# Patient Record
Sex: MALE | Race: WHITE
[De-identification: names, ages, dates, MRNs, and addresses within clinical notes are randomized per-mention and may not be internally consistent; named-entity substitution may affect disease eponyms.]

---

## 2017-02-06 ENCOUNTER — HOSPITAL ENCOUNTER (OUTPATIENT)
Dept: HOSPITAL 53 - M LAB REF | Age: 67
End: 2017-02-06
Attending: PODIATRIST
Payer: MEDICARE

## 2017-02-06 DIAGNOSIS — E11.621: Primary | ICD-10-CM

## 2017-02-06 DIAGNOSIS — M79.675: ICD-10-CM

## 2017-02-06 DIAGNOSIS — L97.521: ICD-10-CM

## 2017-02-06 DIAGNOSIS — L97.511: ICD-10-CM

## 2017-08-17 ENCOUNTER — HOSPITAL ENCOUNTER (OUTPATIENT)
Dept: HOSPITAL 53 - M LAB REF | Age: 67
End: 2017-08-17
Attending: PODIATRIST
Payer: MEDICARE

## 2017-08-17 DIAGNOSIS — E11.621: Primary | ICD-10-CM

## 2018-05-30 ENCOUNTER — HOSPITAL ENCOUNTER (OUTPATIENT)
Dept: HOSPITAL 53 - M LAB REF | Age: 68
End: 2018-05-30
Attending: PODIATRIST
Payer: MEDICARE

## 2018-05-30 DIAGNOSIS — L03.116: Primary | ICD-10-CM

## 2018-05-30 PROCEDURE — 87186 SC STD MICRODIL/AGAR DIL: CPT

## 2020-05-17 ENCOUNTER — HOSPITAL ENCOUNTER (EMERGENCY)
Dept: HOSPITAL 53 - M ED | Age: 70
Discharge: ANOTHER HOSPITAL | End: 2020-05-17
Payer: MEDICARE

## 2020-05-17 VITALS — BODY MASS INDEX: 37.3 KG/M2 | WEIGHT: 290.61 LBS | HEIGHT: 74 IN

## 2020-05-17 VITALS — DIASTOLIC BLOOD PRESSURE: 74 MMHG | SYSTOLIC BLOOD PRESSURE: 160 MMHG

## 2020-05-17 VITALS — DIASTOLIC BLOOD PRESSURE: 66 MMHG | SYSTOLIC BLOOD PRESSURE: 134 MMHG

## 2020-05-17 DIAGNOSIS — E11.9: ICD-10-CM

## 2020-05-17 DIAGNOSIS — R20.0: ICD-10-CM

## 2020-05-17 DIAGNOSIS — E66.9: ICD-10-CM

## 2020-05-17 DIAGNOSIS — I48.91: ICD-10-CM

## 2020-05-17 DIAGNOSIS — I45.10: ICD-10-CM

## 2020-05-17 DIAGNOSIS — I10: ICD-10-CM

## 2020-05-17 DIAGNOSIS — R56.9: ICD-10-CM

## 2020-05-17 DIAGNOSIS — E78.9: ICD-10-CM

## 2020-05-17 DIAGNOSIS — I61.1: Primary | ICD-10-CM

## 2020-05-17 DIAGNOSIS — Z88.2: ICD-10-CM

## 2020-05-17 LAB
ALBUMIN SERPL BCG-MCNC: 3.4 GM/DL (ref 3.2–5.2)
ALT SERPL W P-5'-P-CCNC: 11 U/L (ref 12–78)
APTT BLD: 52.3 SECONDS (ref 25–38.4)
BASOPHILS # BLD AUTO: 0.1 10^3/UL (ref 0–0.2)
BASOPHILS NFR BLD AUTO: 1.1 % (ref 0–1)
BILIRUB CONJ SERPL-MCNC: 0.1 MG/DL (ref 0–0.2)
BILIRUB SERPL-MCNC: 0.4 MG/DL (ref 0.2–1)
BUN SERPL-MCNC: 14 MG/DL (ref 7–18)
CALCIUM SERPL-MCNC: 9 MG/DL (ref 8.8–10.2)
CHLORIDE SERPL-SCNC: 108 MEQ/L (ref 98–107)
CK MB CFR.DF SERPL CALC: 1.24
CK MB SERPL-MCNC: 1.1 NG/ML (ref ?–3.6)
CK SERPL-CCNC: 89 U/L (ref 39–308)
CO2 SERPL-SCNC: 23 MEQ/L (ref 21–32)
CREAT SERPL-MCNC: 0.87 MG/DL (ref 0.7–1.3)
EOSINOPHIL # BLD AUTO: 0.2 10^3/UL (ref 0–0.5)
EOSINOPHIL NFR BLD AUTO: 1.8 % (ref 0–3)
GFR SERPL CREATININE-BSD FRML MDRD: > 60 ML/MIN/{1.73_M2} (ref 42–?)
GLUCOSE SERPL-MCNC: 85 MG/DL (ref 70–100)
HCT VFR BLD AUTO: 45.4 % (ref 42–52)
HGB BLD-MCNC: 12.9 G/DL (ref 13.5–17.5)
INR PPP: 2.63
LYMPHOCYTES # BLD AUTO: 1.8 10^3/UL (ref 1.5–5)
LYMPHOCYTES NFR BLD AUTO: 19.8 % (ref 24–44)
MAGNESIUM SERPL-MCNC: 2.2 MG/DL (ref 1.8–2.4)
MCH RBC QN AUTO: 23.9 PG (ref 27–33)
MCHC RBC AUTO-ENTMCNC: 28.4 G/DL (ref 32–36.5)
MCV RBC AUTO: 84.2 FL (ref 80–96)
MONOCYTES # BLD AUTO: 1.1 10^3/UL (ref 0–0.8)
MONOCYTES NFR BLD AUTO: 11.4 % (ref 0–5)
NEUTROPHILS # BLD AUTO: 6.1 10^3/UL (ref 1.5–8.5)
NEUTROPHILS NFR BLD AUTO: 65.6 % (ref 36–66)
PHOSPHATE SERPL-MCNC: 4.1 MG/DL (ref 2.5–4.9)
PLATELET # BLD AUTO: 352 10^3/UL (ref 150–450)
POTASSIUM SERPL-SCNC: 5.2 MEQ/L (ref 3.5–5.1)
PROT SERPL-MCNC: 9.6 GM/DL (ref 6.4–8.2)
PROTHROMBIN TIME: 28 SECONDS (ref 11.8–14)
RBC # BLD AUTO: 5.39 10^6/UL (ref 4.3–6.1)
SODIUM SERPL-SCNC: 139 MEQ/L (ref 136–145)
TROPONIN I SERPL-MCNC: < 0.02 NG/ML (ref ?–0.1)
WBC # BLD AUTO: 9.3 10^3/UL (ref 4–10)

## 2020-05-17 NOTE — REPVR
PROCEDURE INFORMATION: 

Exam: CT Head Without Contrast 

Exam date and time: 5/17/2020 5:13 PM 

Age: 69 years old 

Clinical indication: Follow-up intracranial hemorrhage. Seizure activity. 



TECHNIQUE: 

Imaging protocol: Computed tomography of the head without contrast. 

Radiation optimization: All CT scans at this facility use at least one of these 

dose optimization techniques: automated exposure control; mA and/or kV 

adjustment per patient size (includes targeted exams where dose is matched to 

clinical indication); or iterative reconstruction. 

Other technique: STROKE PROTOCOL was implemented. 



COMPARISON: 

CT Head without contrast 5/17/2020 4:04 PM 



FINDINGS: 



Brain: Unchanged approximately 2.6 x 2.4 x 2.2 cm (7 mL) age indeterminate 

though suspected subacute intraparenchymal hemorrhage within the medial 

superior left frontal lobe with adjacent vasogenic edema. No significant mass 

effect. No midline shift. No new hemorrhages. Nonspecific hypodensities of the 

periventricular and deep subcortical white matter, most likely secondary to 

chronic small vessel ischemic change. No evidence of mass effect or midline 

shift. Gray-white matter differentiation is preserved.

Ventricles: Mild prominence of the ventricles and sulci, most likely attributed 

to parenchymal volume loss. 

Bones/joints: No acute osseus lesion or fracture. 

Sinuses: Unremarkable as visualized. 

Mastoid air cells: Unremarkable. 

Soft tissues: Unremarkable. 





IMPRESSION: 

1. Unchanged approximately 2.6 x 2.4 x 2.2 cm (7 mL) age indeterminate though 

suspected subacute intraparenchymal hemorrhage within the medial superior left 

frontal lobe with adjacent vasogenic edema. Follow-up at the discretion of 

neurology.

2. Other chronic findings, as above. 



Electronically signed by: New Dietz On 05/17/2020  17:31:59 PM

## 2020-05-17 NOTE — ECGEPIP
[facility] - ED

                                       

                                       Test Date:    2020

Pat Name:     DAMIAN STEEL        Department:   

Patient ID:   [id number]                 Room:         -

Gender:       Male                     Technician:   manda

:          1950               Requested By: GORAN YAO

Order Number: [id number]     Reading MD:   Malorie Carrizales

                                 Measurements

Intervals                              Axis          

Rate:         107                      P:            

WV:           0                        QRS:          24

QRSD:         100                      T:            38

QT:           350                                    

QTc:          468                                    

                           Interpretive Statements

ATRIAL FIBRILLATION WITH RAPID VENTRICULAR RESPONSE

INCOMPLETE RIGHT BUNDLE BRANCH BLOCK

MODERATE ST DEPRESSION

NO PRIOR

Electronically Signed on 2020 21:11:53 EDT by Malorie Carrizales

## 2020-05-18 NOTE — REP
REASON FOR EXAM:  Seizure-like activity.

 

There are no prior studies for comparison.

 

Preliminary report was given by Dr. Brandon at the time the examination was

performed.

 

There is marked and severe motion artifact restricting the examination's

diagnostic capability. There is no gross shift in the midline structures. There

are no gross extra-axial fluid collections. There is no gross ventriculomegaly.

There is no gross skull fracture, however, a skull fracture of significance could

still be obscured by the severe artifact.

 

IMPRESSION:

 

Nondiagnostic examination. Repeat examination is required.

 

 

Electronically Signed by

Vladimir Graham DO 05/18/2020 11:53 A

## 2020-05-18 NOTE — REP
PORTABLE CHEST X-RAY:  SINGLE VIEW.

 

HISTORY:  Status epilepticus.

 

FINDINGS:  The patient is rotated somewhat to the right.  Monitoring electrodes

overlie the chest.  Left hemidiaphragm is slightly elevated.  Heart is mildly

enlarged.  There is no evidence of pleural effusion or pulmonary edema.  In

comparison with chest x-ray from April 14, 2015, there is no significant change.

 

IMPRESSION:

 

No acute disease.

 

 

Electronically Signed by

Mak Birmingham MD 05/18/2020 10:25 A

## 2020-05-20 ENCOUNTER — HOSPITAL ENCOUNTER (INPATIENT)
Dept: HOSPITAL 53 - M PM&R | Age: 70
LOS: 9 days | Discharge: HOME HEALTH | DRG: 57 | End: 2020-05-29
Attending: PHYSICAL MEDICINE & REHABILITATION | Admitting: PHYSICAL MEDICINE & REHABILITATION
Payer: MEDICARE

## 2020-05-20 VITALS — SYSTOLIC BLOOD PRESSURE: 138 MMHG | DIASTOLIC BLOOD PRESSURE: 79 MMHG

## 2020-05-20 VITALS — HEIGHT: 74 IN | WEIGHT: 255.74 LBS | BODY MASS INDEX: 32.82 KG/M2

## 2020-05-20 VITALS — DIASTOLIC BLOOD PRESSURE: 62 MMHG | SYSTOLIC BLOOD PRESSURE: 126 MMHG

## 2020-05-20 VITALS — DIASTOLIC BLOOD PRESSURE: 70 MMHG | SYSTOLIC BLOOD PRESSURE: 124 MMHG

## 2020-05-20 DIAGNOSIS — I48.91: ICD-10-CM

## 2020-05-20 DIAGNOSIS — R56.9: ICD-10-CM

## 2020-05-20 DIAGNOSIS — Z88.2: ICD-10-CM

## 2020-05-20 DIAGNOSIS — E78.5: ICD-10-CM

## 2020-05-20 DIAGNOSIS — I10: ICD-10-CM

## 2020-05-20 DIAGNOSIS — Z79.4: ICD-10-CM

## 2020-05-20 DIAGNOSIS — E11.9: ICD-10-CM

## 2020-05-20 DIAGNOSIS — Z74.1: ICD-10-CM

## 2020-05-20 DIAGNOSIS — F32.9: ICD-10-CM

## 2020-05-20 DIAGNOSIS — L30.4: ICD-10-CM

## 2020-05-20 DIAGNOSIS — L85.9: ICD-10-CM

## 2020-05-20 DIAGNOSIS — I82.531: ICD-10-CM

## 2020-05-20 DIAGNOSIS — I87.2: ICD-10-CM

## 2020-05-20 DIAGNOSIS — R26.89: ICD-10-CM

## 2020-05-20 DIAGNOSIS — Z79.82: ICD-10-CM

## 2020-05-20 DIAGNOSIS — Z79.899: ICD-10-CM

## 2020-05-20 DIAGNOSIS — I69.151: Primary | ICD-10-CM

## 2020-05-20 DIAGNOSIS — Z74.09: ICD-10-CM

## 2020-05-20 DIAGNOSIS — F17.200: ICD-10-CM

## 2020-05-20 RX ADMIN — WHITE PETROLATUM SCH DOSE: 57; 17 PASTE TOPICAL at 21:26

## 2020-05-20 RX ADMIN — ACETAMINOPHEN PRN MG: 325 TABLET ORAL at 13:27

## 2020-05-20 RX ADMIN — IPRATROPIUM BROMIDE AND ALBUTEROL SULFATE SCH ML: .5; 3 SOLUTION RESPIRATORY (INHALATION) at 15:33

## 2020-05-20 RX ADMIN — SIMVASTATIN SCH MG: 20 TABLET, FILM COATED ORAL at 21:23

## 2020-05-20 RX ADMIN — NYSTATIN SCH DOSE: 100000 POWDER TOPICAL at 21:25

## 2020-05-20 RX ADMIN — SODIUM CHLORIDE SCH UNITS: 4.5 INJECTION, SOLUTION INTRAVENOUS at 21:25

## 2020-05-20 RX ADMIN — Medication SCH DOSE: at 21:26

## 2020-05-20 RX ADMIN — SENNOSIDES SCH TAB: 8.6 TABLET, FILM COATED ORAL at 21:27

## 2020-05-20 RX ADMIN — WHITE PETROLATUM SCH DOSE: 57; 17 PASTE TOPICAL at 15:37

## 2020-05-20 RX ADMIN — ACETAMINOPHEN PRN MG: 325 TABLET ORAL at 17:36

## 2020-05-20 RX ADMIN — DOCUSATE SODIUM SCH MG: 100 CAPSULE, LIQUID FILLED ORAL at 21:24

## 2020-05-20 RX ADMIN — IPRATROPIUM BROMIDE AND ALBUTEROL SULFATE SCH ML: .5; 3 SOLUTION RESPIRATORY (INHALATION) at 19:55

## 2020-05-20 RX ADMIN — WHITE PETROLATUM SCH DOSE: 57; 17 PASTE TOPICAL at 17:36

## 2020-05-20 NOTE — HPEPDOC
Physiatrist Note


DATE OF ADMISSION:5-20-20





DATE OF SERVICE: 5-20-20





TIME OF ADMISSION: Please refer to physician's admission order.





SOURCE OF ADMISSION INFORMATION: [facility] record and patient





CHIEF COMPLAINT: intracranial hemorrhage/stroke





HISTORY OF PRESENT ILLNESS:


69M pmh Afib on Coumadin, chronic RLE DVT, DM, smoker who presented to [facility] ED 

with right sided weakness and shaking where he was found to have a left 

posterior frontal intra-parenchymal hemorrhage and transferred to [facility] 

on 5-17-20 after receiving vitamin K and FFP. CTA neck 5/17/20 showed  No 

significant interval change in the left posterior frontal intraparenchymal 

hemorrhage and surrounding edema. An MRI of the brain without and with 

intravenous contrast administration is recommended to rule out an underlying 

mass. He was admitted to the Neuro ICU, started on Keppra for seizure 

prophylaxis, and had follow-up EEG which was negative for epileptiform activity.

His Coumadin was held and follow-up MRI with and without contrast did not show 

mass, instead showing Subcortical intraparenchymal hemorrhage in the superior 

left frontoparietal lobes with surrounding edema and mild mass effect. No 

apparent enhancement in the region of the hemorrhage allowing for the limitation

of the inherent T1 hyperintensity. No gross abnormal enhancement elsewhere in 

the brain parenchyma. He was evaluated by wound care services for his chronic 

venous stasis changes on his bilat LE, open ankle ulcers, and what was initially

thought to be a DTI to his sacrum thought to be chronic scarring and remodeling 

from history of incontinence. 


He was evaluated by therapy, found to have significant impairments in mobility 

and ADLs, started on aspirin and SSI for motor recovery and deemed medically 

appropriate for discharge to ARU on 5-20-20.











REVIEW OF SYSTEMS: The following is a completed review of systems and has been 

reviewed. Review of systems otherwise unremarkable.


PAIN: Patient self reports no pain


EYES: No recent vision changes


EARS, NOSE, & THROAT: No throat pain, or dysphagia, or rhinorrhea


CARDIOVASCULAR: Denies chest pain or palpitations


PULMONARY: Denies shortness of breath


GASTROINTESTINAL: Denies constipation/diarrhea


GENITOURINARY: denies dysuria


MUSCULOSKELETAL: generalized weakness


NEUROLOGICAL: mild right sided paresis


HEMATOLOGICAL: denies easy bruising


SKIN: sacral ulcer and bilat LE hyperpigmentation


PSYCHIATRIC: Unremarkable.


All other review of systems found to be negative.





PAST MEDICAL HISTORY:


as per HPI








SHx:


perianal cyst removal 





ALLERGIES: Please see below.





MEDICATIONS: Please see below.





SOCIAL HISTORY: Daily pack a day smoker, no etoh/illicit drugs





DIET: low sodium 





PHYSICAL EXAMINATION:


VITAL SIGNS: Please see below.


GENERAL: Pleasant and cooperative. No acute distress. 


HEENT: PERRL. Extraocular movements intact. Clear conjunctiva, no facial droop


CARDIOVASCULAR: Regular rate and rhythm. No murmurs, rubs, or gallops


LUNGS: +scattered rhonchi


ABDOMEN: Soft, nontender, nondistended. Positive bowel sounds. Normal active 

bowel sounds


NEUROLOGICAL: Alert and oriented times three. Cranial nerves II through XII 

grossly intact. Sensation grossly intact in all 4 limbs


EXTREMITIES: 5\5 strength left UE, 4+ RUE with mild pronator drift, 4+\5 

strength right lower extremity. 5/5 strength in left lower extremity. 





SKIN: large sacral and posterior thigh hyperpigmentation, scar tissue vs large 

DTI?, intertriginous erythema, bilat calves with venous stasis changes and 

hyperkeratotic 





LABORATORY DATA: Please see below.





IMAGING:Imaging documentation personally reviewed by record





FUNCTIONAL STATUS: 


Premorbid: Modified Independent with all activities of daily life as well as 

mobility with RW 


On Admission: Max-total assist for bed mobility, functional transfers, min 

assist for feeding


GOALS: Mod-I household distances with RW, functional transfers, dressing, 

toileting, supervision for bathing 





ASSESSMENT:69-year-old M with past medical history of Afib and chronic RLE DVT 

who presents status post  hemorrhagic stroke





PLAN:


1. Rehab- PT/OT advnace gait and ADLs, strengthen/stretch/maintain ROM all 4 

limbs


SLP- cog eval, patient was tolerating regular diet, however will downgrade to 

level 3 until SLP evaluates him here


2. Neuro: s/p left posterior frontal intraparenchymal hemorrhage with right 

sided paresis, Warfarin on hold to be cleared by neurosurgery


-c/u ASA, statin for secodnay stroke prevention and optimize BP control


-c/u prozac for motor recovery


2. CArdiac: hx of Afib- coumadin on hold, c/u betablocker


-recent ECHO 5-18-20 showing, "left ventricle size is normal. Left ventricular 

wall thickness is 


normal. Overall LV systolic function is in the low normal range. The calculated 

left ventricular ejection fraction is 51.4% . Indeterminate LV diastolic 

function." will monitor for fluid overload, medicine consulted to assist in 

overall management


-HLD c/u Zocor


-HTn c/u lisinoprol


3. Endo: A1c 5.4% on 5-17-20 will hold off on continuing metformin and monitor 

FS


4. Resp: current smoker, c/u NicoDerm patch, Duonebs, guaifenesin, and incentive

spirometry, monitor for infection


6. Skin: patient with large sacral/thigh ecchymosis and scar tissue, unclear if 

deep tissue injury as per [facility]XX notes appears chronic- will c/u barrier cream, 

turn q2 in bed, and avoid elevation of head of bed


-heel floats/optifoam to bilat heels


-venous stasis changes to bilat LE- c/u vanicream


-intertriginous rash c/u nystatin powder 


7. Pain: tylenol and oxycodone prn


8. DVT ppx: patient with known chronic right sided DVT, recent Doppler 5-19-20, 

"Chronic non occlusive deep venous thrombosis of proximal


to distal right popliteal vein." , c/u heparin 


9. GI ppx; protonix


10. : monitor PVRs


11. Dispo: tbd








POST ADMISSION PHYSICIAN EVALUATION: 


Medical and functional status: Description of medical status, medical 

assessment: As above. Rehabilitation diagnosis and current and prior cold morbid

medical conditions as above. Risk of complications and plans to mitigate them as

above. Description of functional status current status is as above. Prior status

as above.


Status compared to preadmission: There are no clinically significant differences

between the patient's current status and the information described on the 

preadmission screening document.


Treatment plan anticipated: Treatment plan is as described above. Required 

disciplines including physical therapy, occupational therapy, others as noted 

above.


Intensity of services: 3 hours a day, 6 days a week. 


Special considerations: There are no specific special or safety considerations 

that would likely preclude immediate implementation of an intensive 

rehabilitation program or subsequently influence the plan of care.





ATTESTATION: Considering all the information above, it is my best judgment that 

this patient requires intensive rehabilitation therapy as described above and an

inpatient hospital environment due to the complexity of nursing, medical, and 

rehabilitation needs required by the patient. Furthermore, this patient can 

reasonably be expected to participate in an benefit from an inpatient 

rehabilitation stay with an interdisciplinary team approach to the delivery of 

rehabilitation care under the direction and supervision of rehabilitation 

physician.





PROGNOSIS: good





ESTIMATED LENGTH OF STAY:18-21 days.





PROJECTED DISCHARGE DESTINATION: Home with family support and any durable 

medical equipment required to increase functional safety and mobility.





TIME SPENT COUNSELING AND COORDINATING INITIAL CARE: Greater than 70 minutes.


Vital Signs





Vital Sign - Last 24 Hours








 5/20/20 5/20/20 5/20/20 5/20/20





 12:00 13:25 14:00 14:00


 


Temp 97.2  98.5 98.5


 


Pulse 68  72 72


 


Resp 18 18 18 18


 


B/P (MAP) 124/70 (88)  126/62 126/62 (83)


 


Pulse Ox 93  96 96


 


O2 Delivery Room Air  Room Air Room Air











Laboratory Data


Labs 24H


Laboratory Tests 2


5/20/20 12:25: Coronavirus (COVID-19)(PCR) NEGATIVE





Home Medications


Scheduled


Aspirin (Aspirin EC) 81 Mg Tablet.dr, 81 MG PO DAILY, (Reported)


   STARTED AT [facility] 


Carvedilol (Carvedilol) 12.5 Mg Tablet, 12.5 MG PO BID, (Reported)


   STARTED AT [facility] 


Docusate Sodium (Colace) 100 Mg Capsule, 100 MG PO DAILY, (Reported)


Fluoxetine Hcl (Fluoxetine HCl) 40 Mg Capsule, 40 MG PO DAILY, (Reported)


Heparin Sodium,Porcine (Heparin Sodium) 5,000 Unit/1 Ml Vial, 5,000 UNIT SC BID,

(Reported)


   STARTED AT [facility] 


Insulin Human Lispro (Humalog) 100 Unit/1 Ml Vial, 1 DOSE SC AC, (Reported)


   PER SLIDING SCALE - STARTED AT [facility] 


Lidocaine (Lidocaine) 5% Adh..patch, 2 PATCH TOP DAILY, (Reported)


   APPLY TO BOTH KNEES, STARTED AT [facility] 


Lisinopril (Lisinopril) 2.5 Mg Tablet, 2.5 MG PO DAILY, (Reported)


Metformin HCl (Metformin HCl) 500 Mg Tablet, 500 MG PO DAILY, (Reported)


Nicotine (Nicotine Patch) 14 Mg/24 Hr Patch.td24, 14 MG TOP DAILY, (Reported)


   STARTED AT [facility] 


Simvastatin (Simvastatin) 20 Mg Tablet, 20 MG PO QHS, (Reported)


Valproic Acid (Valproic Acid) 250 Mg Capsule, 750 MG PO BID, (Reported)


   STARTED AT [facility] 





Scheduled PRN


Acetaminophen (Tylenol) 325 Mg Tablet, 650 MG PO Q6H PRN for PAIN, (Reported)


Hydrocodone/Acetaminophen (Hydrocodone-Acetamin  mg) 1 Each Tablet, 1 TAB 

PO Q6H PRN for PAIN, (Reported)





Allergies


Coded Allergies:  


     Sulfa (Sulfonamide Antibiotics) (Verified  Allergy, Intermediate, rash, 

5/17/20)





A-FIB/CHADSVASC


A-FIB History


Current/History of A-Fib/PAF?:  Yes


Current PO Anticoag Therapy:  No











STEPHANE FERNANDO MD         May 20, 2020 14:43

## 2020-05-21 VITALS — DIASTOLIC BLOOD PRESSURE: 76 MMHG | SYSTOLIC BLOOD PRESSURE: 136 MMHG

## 2020-05-21 VITALS — DIASTOLIC BLOOD PRESSURE: 77 MMHG | SYSTOLIC BLOOD PRESSURE: 134 MMHG

## 2020-05-21 VITALS — DIASTOLIC BLOOD PRESSURE: 58 MMHG | SYSTOLIC BLOOD PRESSURE: 104 MMHG

## 2020-05-21 LAB
ALBUMIN SERPL BCG-MCNC: 2.7 GM/DL (ref 3.2–5.2)
ALT SERPL W P-5'-P-CCNC: 11 U/L (ref 12–78)
BASOPHILS # BLD AUTO: 0.1 10^3/UL (ref 0–0.2)
BASOPHILS NFR BLD AUTO: 1 % (ref 0–1)
BILIRUB SERPL-MCNC: 0.5 MG/DL (ref 0.2–1)
BUN SERPL-MCNC: 17 MG/DL (ref 7–18)
CALCIUM SERPL-MCNC: 9 MG/DL (ref 8.8–10.2)
CHLORIDE SERPL-SCNC: 110 MEQ/L (ref 98–107)
CO2 SERPL-SCNC: 28 MEQ/L (ref 21–32)
CREAT SERPL-MCNC: 0.86 MG/DL (ref 0.7–1.3)
EOSINOPHIL # BLD AUTO: 0.2 10^3/UL (ref 0–0.5)
EOSINOPHIL NFR BLD AUTO: 2.6 % (ref 0–3)
EST. AVERAGE GLUCOSE BLD GHB EST-MCNC: 120 MG/DL (ref 60–110)
GFR SERPL CREATININE-BSD FRML MDRD: > 60 ML/MIN/{1.73_M2} (ref 42–?)
GLUCOSE SERPL-MCNC: 100 MG/DL (ref 70–100)
HCT VFR BLD AUTO: 42.4 % (ref 42–52)
HGB BLD-MCNC: 12.4 G/DL (ref 13.5–17.5)
LYMPHOCYTES # BLD AUTO: 1 10^3/UL (ref 1.5–5)
LYMPHOCYTES NFR BLD AUTO: 14.4 % (ref 24–44)
MCH RBC QN AUTO: 24.6 PG (ref 27–33)
MCHC RBC AUTO-ENTMCNC: 29.2 G/DL (ref 32–36.5)
MCV RBC AUTO: 84.1 FL (ref 80–96)
MONOCYTES # BLD AUTO: 0.7 10^3/UL (ref 0–0.8)
MONOCYTES NFR BLD AUTO: 10.6 % (ref 0–5)
NEUTROPHILS # BLD AUTO: 4.9 10^3/UL (ref 1.5–8.5)
NEUTROPHILS NFR BLD AUTO: 71.1 % (ref 36–66)
PLATELET # BLD AUTO: 274 10^3/UL (ref 150–450)
POTASSIUM SERPL-SCNC: 4.7 MEQ/L (ref 3.5–5.1)
PROT SERPL-MCNC: 7.8 GM/DL (ref 6.4–8.2)
RBC # BLD AUTO: 5.04 10^6/UL (ref 4.3–6.1)
SODIUM SERPL-SCNC: 144 MEQ/L (ref 136–145)
WBC # BLD AUTO: 7 10^3/UL (ref 4–10)

## 2020-05-21 RX ADMIN — Medication SCH DOSE: at 08:57

## 2020-05-21 RX ADMIN — WHITE PETROLATUM SCH DOSE: 57; 17 PASTE TOPICAL at 12:00

## 2020-05-21 RX ADMIN — NYSTATIN SCH DOSE: 100000 POWDER TOPICAL at 08:57

## 2020-05-21 RX ADMIN — SODIUM CHLORIDE SCH UNITS: 4.5 INJECTION, SOLUTION INTRAVENOUS at 08:56

## 2020-05-21 RX ADMIN — ASPIRIN SCH MG: 81 TABLET ORAL at 08:53

## 2020-05-21 RX ADMIN — IPRATROPIUM BROMIDE AND ALBUTEROL SULFATE SCH ML: .5; 3 SOLUTION RESPIRATORY (INHALATION) at 13:05

## 2020-05-21 RX ADMIN — NYSTATIN SCH DOSE: 100000 POWDER TOPICAL at 20:36

## 2020-05-21 RX ADMIN — SENNOSIDES SCH TAB: 8.6 TABLET, FILM COATED ORAL at 20:34

## 2020-05-21 RX ADMIN — DOCUSATE SODIUM SCH MG: 100 CAPSULE, LIQUID FILLED ORAL at 08:55

## 2020-05-21 RX ADMIN — IPRATROPIUM BROMIDE AND ALBUTEROL SULFATE SCH ML: .5; 3 SOLUTION RESPIRATORY (INHALATION) at 07:31

## 2020-05-21 RX ADMIN — DOCUSATE SODIUM SCH MG: 100 CAPSULE, LIQUID FILLED ORAL at 20:34

## 2020-05-21 RX ADMIN — PANTOPRAZOLE SODIUM SCH MG: 40 TABLET, DELAYED RELEASE ORAL at 08:53

## 2020-05-21 RX ADMIN — WHITE PETROLATUM SCH DOSE: 57; 17 PASTE TOPICAL at 18:49

## 2020-05-21 RX ADMIN — ACETAMINOPHEN PRN MG: 325 TABLET ORAL at 08:55

## 2020-05-21 RX ADMIN — Medication SCH DOSE: at 20:36

## 2020-05-21 RX ADMIN — WHITE PETROLATUM SCH DOSE: 57; 17 PASTE TOPICAL at 05:17

## 2020-05-21 RX ADMIN — SIMVASTATIN SCH MG: 20 TABLET, FILM COATED ORAL at 20:34

## 2020-05-21 RX ADMIN — IPRATROPIUM BROMIDE AND ALBUTEROL SULFATE SCH ML: .5; 3 SOLUTION RESPIRATORY (INHALATION) at 19:31

## 2020-05-21 RX ADMIN — NICOTINE SCH PATCH: 14 PATCH, EXTENDED RELEASE TRANSDERMAL at 08:55

## 2020-05-21 RX ADMIN — SODIUM CHLORIDE SCH UNITS: 4.5 INJECTION, SOLUTION INTRAVENOUS at 20:36

## 2020-05-21 RX ADMIN — LISINOPRIL SCH MG: 2.5 TABLET ORAL at 08:54

## 2020-05-21 NOTE — CR
DATE OF CONSULTATION:  05/21/2020

 

Requested by Dr. Zamora.

 

This is a medical evaluation of Chadwick Beach, who was transferred from

[facility] to our rehabilitation center.  He had an intracerebral

hemorrhage on 05/17/2020.  He was transferred to [facility] Stroke Unit.  No

neurosurgical intervention was required.  He was transferred back for

rehabilitation.

 

PAST MEDICAL HISTORY:  Shows the left super frontal intraparenchymal hemorrhage

treated medically with a reversal of his anticoagulation.  He has a history of

atrial fibrillation for which he was previously anticoagulated, hyperlipidemia,

type 2 diabetes, and a history of depression.  He was started on aspirin on

05/19/2020.  The plan is for followup in the stroke clinic in a month, and they

plan to switch him to Eliquis 5 mg twice a day.

 

TRANSFER MEDICATIONS:

-  aspirin 81 mg daily

-  carvedilol 12.5 mg twice a day

-  Colace as needed

-  Prozac 40 mg daily

-  Vicodin 10/325 as needed

-  lisinopril 2.5 mg daily

-  metformin 500 mg twice a day

-  simvastatin 20 mg daily

 

ALLERGIES:  Are to SULFA.

 

SOCIAL HISTORY:  A pack-per-day smoker.  No alcohol.  He is .

 

FAMILY HISTORY:  Noncontributory.

 

PHYSICAL EXAMINATION:

136/76.  Pulse 80.  Respiratory rate 17.  94% oxygen (O2) saturation.

General appearance:  Alert, conversant, no distress.

HEENT:  Unremarkable.

Lungs clear.

Heart:  Regular rate and rhythm.  1/6 systolic ejection murmur.

Abdomen:  Soft, nontender, no masses.

No peripheral edema.  Venous stasis dermatitis lower extremities.  Pulses

decreased, palpable on both feet.  Normal strength in the arms and legs to gross

testing.

 

IMPRESSION:

 

1.  Status post intraparenchymal hemorrhage.  He is on aspirin 81 mg daily, which

will be continued.  The plan is for him to start Eliquis 5 mg twice a day in a

month after he has seen the stroke unit at [facility].  They plan another repeat CT

scan of his brain before then.

 

2.  Hypertension, well controlled on current regimen.  Continue his current dose

of lisinopril 2.5 mg daily.

 

3.  Type 2 diabetes.  Currently on a restricted-sweets diet.  Not on his

metformin, which he was taking 500 mg daily.  Will check a hemoglobin A1c.  His

blood sugars so far have been normal.  He might not need to restart this.  His

metformin is on hold.  He is currently on sliding scale insulin with coverage.

His blood sugars have been normal to date.  If he requires scant coverage

(typically less than 10 units per day), then the sliding scale coverage could be

discontinued.  I have ordered a hemoglobin A1c.

 

4.  Hyperlipidemia.  Continue his simvastatin 20 mg daily.

 

5.  History of depression.  Continue his fluoxetine 40 mg daily.

 

6.  Recent intraparenchymal hemorrhage.  He is on valproic acid 750 mg twice a

day for seizure prophylaxis.

 

7.  Atrial fibrillation.  His rate is controlled with carvedilol 12.5 mg twice a

day.  His anticoagulant was discontinued after the recent stroke.

## 2020-05-22 VITALS — SYSTOLIC BLOOD PRESSURE: 133 MMHG | DIASTOLIC BLOOD PRESSURE: 68 MMHG

## 2020-05-22 VITALS — SYSTOLIC BLOOD PRESSURE: 130 MMHG | DIASTOLIC BLOOD PRESSURE: 78 MMHG

## 2020-05-22 VITALS — DIASTOLIC BLOOD PRESSURE: 68 MMHG | SYSTOLIC BLOOD PRESSURE: 138 MMHG

## 2020-05-22 RX ADMIN — DOCUSATE SODIUM SCH MG: 100 CAPSULE, LIQUID FILLED ORAL at 09:31

## 2020-05-22 RX ADMIN — NICOTINE SCH PATCH: 14 PATCH, EXTENDED RELEASE TRANSDERMAL at 09:00

## 2020-05-22 RX ADMIN — SENNOSIDES SCH TAB: 8.6 TABLET, FILM COATED ORAL at 20:39

## 2020-05-22 RX ADMIN — SIMVASTATIN SCH MG: 20 TABLET, FILM COATED ORAL at 20:38

## 2020-05-22 RX ADMIN — ACETAMINOPHEN PRN MG: 325 TABLET ORAL at 13:08

## 2020-05-22 RX ADMIN — NYSTATIN SCH DOSE: 100000 POWDER TOPICAL at 20:39

## 2020-05-22 RX ADMIN — WHITE PETROLATUM SCH DOSE: 57; 17 PASTE TOPICAL at 13:08

## 2020-05-22 RX ADMIN — Medication SCH DOSE: at 09:33

## 2020-05-22 RX ADMIN — DOCUSATE SODIUM SCH MG: 100 CAPSULE, LIQUID FILLED ORAL at 20:40

## 2020-05-22 RX ADMIN — SODIUM CHLORIDE SCH UNITS: 4.5 INJECTION, SOLUTION INTRAVENOUS at 20:39

## 2020-05-22 RX ADMIN — IPRATROPIUM BROMIDE AND ALBUTEROL SULFATE SCH ML: .5; 3 SOLUTION RESPIRATORY (INHALATION) at 13:46

## 2020-05-22 RX ADMIN — SODIUM CHLORIDE SCH UNITS: 4.5 INJECTION, SOLUTION INTRAVENOUS at 09:31

## 2020-05-22 RX ADMIN — Medication SCH DOSE: at 20:39

## 2020-05-22 RX ADMIN — WHITE PETROLATUM SCH DOSE: 57; 17 PASTE TOPICAL at 05:13

## 2020-05-22 RX ADMIN — ASPIRIN SCH MG: 81 TABLET ORAL at 09:31

## 2020-05-22 RX ADMIN — NYSTATIN SCH DOSE: 100000 POWDER TOPICAL at 09:32

## 2020-05-22 RX ADMIN — WHITE PETROLATUM SCH DOSE: 57; 17 PASTE TOPICAL at 00:18

## 2020-05-22 RX ADMIN — LISINOPRIL SCH MG: 2.5 TABLET ORAL at 09:32

## 2020-05-22 RX ADMIN — PANTOPRAZOLE SODIUM SCH MG: 40 TABLET, DELAYED RELEASE ORAL at 09:31

## 2020-05-22 RX ADMIN — IPRATROPIUM BROMIDE AND ALBUTEROL SULFATE SCH ML: .5; 3 SOLUTION RESPIRATORY (INHALATION) at 20:44

## 2020-05-22 RX ADMIN — IPRATROPIUM BROMIDE AND ALBUTEROL SULFATE SCH ML: .5; 3 SOLUTION RESPIRATORY (INHALATION) at 07:12

## 2020-05-22 RX ADMIN — WHITE PETROLATUM SCH DOSE: 57; 17 PASTE TOPICAL at 17:31

## 2020-05-22 NOTE — IPNPDOC
PM&R Progress Note


DATE OF SERVICE:  May 21, 2020


Physiatrist Progress Note


Subjective:


Patient wondering if can have oxycodone more frequently. He is agreeable to 

having barrier cream applied to his sacrum during the night.





REVIEW OF SYSTEMS: The following is a completed review of systems and has been 

reviewed. Review of systems otherwise unremarkable.


PAIN: Patient self reports no pain


EYES: No recent vision changes


EARS, NOSE, & THROAT: No throat pain, or dysphagia, or rhinorrhea


CARDIOVASCULAR: Denies chest pain or palpitations


PULMONARY: Denies shortness of breath


GASTROINTESTINAL: Denies constipation/diarrhea


GENITOURINARY: denies dysuria


MUSCULOSKELETAL: generalized weakness


NEUROLOGICAL: mild right sided paresis


HEMATOLOGICAL: denies easy bruising


SKIN: sacral ulcer and bilat LE hyperpigmentation


PSYCHIATRIC: Unremarkable.


All other review of systems found to be negative.

















PHYSICAL EXAMINATION:


VITAL SIGNS: Please see below.


GENERAL: Pleasant and cooperative. No acute distress. 


HEENT: PERRL. Extraocular movements intact. Clear conjunctiva, no facial droop


CARDIOVASCULAR: Regular rate and rhythm. No murmurs, rubs, or gallops


LUNGS: +scattered rhonchi


ABDOMEN: Soft, nontender, nondistended. Positive bowel sounds. Normal active 

bowel sounds


NEUROLOGICAL: Alert and oriented times three. Cranial nerves II through XII aj

sly intact. Sensation grossly intact in all 4 limbs


EXTREMITIES: 5\5 strength left UE, 4+ RUE with mild pronator drift, 4+\5 str

ength right lower extremity. 5/5 strength in left lower extremity. 





SKIN: large sacral and posterior thigh hyperpigmentation, scar tissue vs large 

DTI?, intertriginous erythema, bilat calves with venous stasis changes and 

hyperkeratotic 

















ASSESSMENT:69-year-old M with past medical history of Afib and chronic RLE DVT 

who presents status post  hemorrhagic stroke





PLAN:


1. Rehab- PT/OT advnace gait and ADLs, strengthen/stretch/maintain ROM all 4 

limbs


SLP- cog eval, patient was tolerating regular diet, however will downgrade to 

level 3 until SLP evaluates him here


2. Neuro: s/p left posterior frontal intraparenchymal hemorrhage with right 

sided paresis, Warfarin on hold to be cleared by neurosurgery


-c/u ASA, statin for secondary stroke prevention and optimize BP control


-c/u prozac for motor recovery


2. CArdiac: hx of Afib- coumadin on hold, c/u betablocker


-recent ECHO 5-18-20 showing, "left ventricle size is normal. Left ventricular 

wall thickness is 


normal. Overall LV systolic function is in the low normal range. The calculated 

left ventricular ejection fraction is 51.4% . Indeterminate LV diastolic 

function." will monitor for fluid overload, medicine consulted to assist in 

overall management


-HLD c/u Zocor


-HTn c/u lisinoprol


3. Endo: A1c 5.4% on 5-17-20 will hold off on continuing metformin and monitor 

FS


4. Resp: current smoker, c/u NicoDerm patch, Duonebs, guaifenesin, and incentive

spirometry, monitor for infection


6. Skin: patient with large sacral/thigh ecchymosis and scar tissue, unclear if 

deep tissue injury as per [facility] notes appears chronic- will c/u barrier cream, 

turn q2 in bed, and avoid elevation of head of bed


-heel floats/optifoam to bilat heels


-venous stasis changes to bilat LE- c/u vanicream


-intertriginous rash c/u nystatin powder 


7. Pain: tylenol and oxycodone (will increase to q4h prn)


8. DVT ppx: patient with known chronic right sided DVT, recent Doppler 5-19-20, 

"Chronic non occlusive deep venous thrombosis of proximal


to distal right popliteal vein." c/u heparin 


9. GI ppx; protonix


10. : monitor PVRs


11. Dispo: tbd


Allergies


Coded Allergies:  


     Sulfa (Sulfonamide Antibiotics) (Verified  Allergy, Intermediate, rash, 

5/17/20)





Vital Signs





Vital Signs








  Date Time  Temp Pulse Resp B/P (MAP) Pulse Ox O2 Delivery O2 Flow Rate FiO2


 


5/22/20 06:22   18     


 


5/22/20 06:00 97.8 80  133/68 (89) 93 Room Air  











Laboratory Data


Labs 24H


Laboratory Tests 2


5/21/20 11:35: Bedside Glucose (Misc Panel) 123H


5/21/20 16:31: Bedside Glucose (Misc Panel) 85


5/21/20 19:40: Bedside Glucose (Misc Panel) 115H


5/22/20 05:11: Bedside Glucose (Misc Panel) 101





Current Medications


Current Medications





Current Medications








 Medications


  (Trade)  Dose


 Ordered  Sig/Ministerio


 Route


 PRN Reason  Start Time


 Stop Time Status Last Admin


Dose Admin


 


 Acetaminophen


  (Tylenol Tab)  650 mg  Q4HP  PRN


 PO


 fever/MILD PAIN (PS 1-4)  5/20/20 12:30


    5/21/20 08:55





 


 Albuterol/


 Ipratropium


  (Duoneb (Ipr


 0.5mg/Alb 2.5mg))  3 ml  RTID


 NEB


   5/20/20 14:00


    5/22/20 07:12





 


 Aspirin


  (Ecotrin)  81 mg  DAILY


 PO


   5/21/20 09:00


    5/21/20 08:53





 


 Carvedilol


  (COReg)  12.5 mg  BID


 PO


   5/20/20 21:00


    5/21/20 20:35





 


 Dextrose


  (Dextrose 50%)  25 ml  ASDIRECTED  PRN


 IV


 SEE LABEL COMMENTS  5/20/20 12:30


     





 


 Docusate Sodium


  (Colace)  100 mg  BID


 PO


   5/20/20 21:00


    5/21/20 20:34





 


 Emollient Cream


  (Vanicream)  1 dose  BID


 TOP


   5/20/20 21:00


    5/21/20 20:36





 


 Fluoxetine HCl


  (PROzac)  40 mg  DAILY


 PO


   5/21/20 09:00


    5/21/20 08:56





 


 Glucagon


  (Glucagon)  1 mg  ASDIRECTED  PRN


 SC


 SEE LABEL COMMENTS  5/20/20 12:30


     





 


 Glucose


  (Glucose)  16 GM  ASDIRECTED  PRN


 PO


 SEE LABEL COMMENTS  5/20/20 12:30


     





 


 Guaifenesin


  (Robitussin Tab)  400 mg  TID


 PO


   5/20/20 16:00


    5/21/20 20:35





 


 Heparin Sodium


  (Porcine)


  (Heparin)  5,000 units  Q12H


 SC


   5/20/20 21:00


    5/21/20 20:36





 


 Home Med


  (Med Rec


 Complete!)    ASDIRECTED


 XX


   5/20/20 13:30


 5/20/20 13:31 DC  





 


 Insulin Human


 Lispro


  (HumaLOG INSULIN)  SEE


 PROTOCOL


 TABLE  AC


 SC


   5/20/20 12:00


 5/20/20 13:22 DC  





 


 Insulin Human


 Lispro


  (HumaLOG INSULIN)  SEE


 PROTOCOL


 TABLE  QHS


 SC


   5/20/20 21:00


 5/20/20 13:22 DC  





 


 Lisinopril


  (Prinivil)  2.5 mg  DAILY


 PO


   5/21/20 09:00


    5/21/20 08:54





 


 Magnesium


 Hydroxide


  (Milk Of


 Magnesia)  30 ml  DAILYPRN  PRN


 PO


 CONSTIPATION  5/20/20 12:30


     





 


 Nicotine


  (Nicoderm Cq


 14mg)  1 patch  DAILY


 TD


   5/21/20 09:00


    5/21/20 08:55





 


 Nystatin


  (Mycostatin


 Powder, Nystop)  apply to


 abdominal


 fo...  BID


 TOP


   5/20/20 21:00


    5/21/20 20:36





 


 Oxycodone HCl


  (Roxicodone,


 Oxyir)  5 mg  Q4H  PRN


 PO


 PAIN  5/21/20 12:45


    5/22/20 05:37





 


 Oxycodone HCl


  (Roxicodone,


 Oxyir)  5 mg  Q6HP  PRN


 PO


 PAIN  5/20/20 12:30


 5/21/20 12:31 DC 5/21/20 11:21





 


 Pantoprazole


 Sodium


  (Protonix)  40 mg  DAILY


 PO


   5/21/20 09:00


    5/21/20 08:53





 


 Senna


  (Senokot)  1 tab  QHS


 PO


   5/20/20 21:00


    5/21/20 20:34





 


 Simvastatin


  (Zocor)  20 mg  QHS


 PO


   5/20/20 21:00


    5/21/20 20:34




















STEPHANE FERNANDO MD         May 22, 2020 09:32

## 2020-05-23 VITALS — DIASTOLIC BLOOD PRESSURE: 62 MMHG | SYSTOLIC BLOOD PRESSURE: 140 MMHG

## 2020-05-23 VITALS — DIASTOLIC BLOOD PRESSURE: 59 MMHG | SYSTOLIC BLOOD PRESSURE: 111 MMHG

## 2020-05-23 VITALS — SYSTOLIC BLOOD PRESSURE: 129 MMHG | DIASTOLIC BLOOD PRESSURE: 77 MMHG

## 2020-05-23 RX ADMIN — ASPIRIN SCH MG: 81 TABLET ORAL at 10:08

## 2020-05-23 RX ADMIN — IPRATROPIUM BROMIDE AND ALBUTEROL SULFATE SCH ML: .5; 3 SOLUTION RESPIRATORY (INHALATION) at 07:49

## 2020-05-23 RX ADMIN — DOCUSATE SODIUM SCH MG: 100 CAPSULE, LIQUID FILLED ORAL at 09:00

## 2020-05-23 RX ADMIN — Medication SCH DOSE: at 10:09

## 2020-05-23 RX ADMIN — IPRATROPIUM BROMIDE AND ALBUTEROL SULFATE SCH ML: .5; 3 SOLUTION RESPIRATORY (INHALATION) at 13:11

## 2020-05-23 RX ADMIN — LISINOPRIL SCH MG: 2.5 TABLET ORAL at 10:07

## 2020-05-23 RX ADMIN — NYSTATIN SCH DOSE: 100000 POWDER TOPICAL at 20:59

## 2020-05-23 RX ADMIN — WHITE PETROLATUM SCH DOSE: 57; 17 PASTE TOPICAL at 17:20

## 2020-05-23 RX ADMIN — Medication SCH DOSE: at 20:59

## 2020-05-23 RX ADMIN — SIMVASTATIN SCH MG: 20 TABLET, FILM COATED ORAL at 20:59

## 2020-05-23 RX ADMIN — DOCUSATE SODIUM SCH MG: 100 CAPSULE, LIQUID FILLED ORAL at 20:59

## 2020-05-23 RX ADMIN — SODIUM CHLORIDE SCH UNITS: 4.5 INJECTION, SOLUTION INTRAVENOUS at 20:58

## 2020-05-23 RX ADMIN — NYSTATIN SCH DOSE: 100000 POWDER TOPICAL at 10:08

## 2020-05-23 RX ADMIN — SODIUM CHLORIDE SCH UNITS: 4.5 INJECTION, SOLUTION INTRAVENOUS at 10:10

## 2020-05-23 RX ADMIN — NICOTINE SCH PATCH: 14 PATCH, EXTENDED RELEASE TRANSDERMAL at 09:00

## 2020-05-23 RX ADMIN — IPRATROPIUM BROMIDE AND ALBUTEROL SULFATE SCH ML: .5; 3 SOLUTION RESPIRATORY (INHALATION) at 21:10

## 2020-05-23 RX ADMIN — WHITE PETROLATUM SCH DOSE: 57; 17 PASTE TOPICAL at 00:00

## 2020-05-23 RX ADMIN — WHITE PETROLATUM SCH DOSE: 57; 17 PASTE TOPICAL at 12:00

## 2020-05-23 RX ADMIN — PANTOPRAZOLE SODIUM SCH MG: 40 TABLET, DELAYED RELEASE ORAL at 10:07

## 2020-05-23 RX ADMIN — SENNOSIDES SCH TAB: 8.6 TABLET, FILM COATED ORAL at 20:58

## 2020-05-23 RX ADMIN — WHITE PETROLATUM SCH DOSE: 57; 17 PASTE TOPICAL at 05:50

## 2020-05-24 VITALS — SYSTOLIC BLOOD PRESSURE: 145 MMHG | DIASTOLIC BLOOD PRESSURE: 80 MMHG

## 2020-05-24 VITALS — SYSTOLIC BLOOD PRESSURE: 132 MMHG | DIASTOLIC BLOOD PRESSURE: 75 MMHG

## 2020-05-24 VITALS — SYSTOLIC BLOOD PRESSURE: 142 MMHG | DIASTOLIC BLOOD PRESSURE: 84 MMHG

## 2020-05-24 RX ADMIN — SENNOSIDES SCH TAB: 8.6 TABLET, FILM COATED ORAL at 19:34

## 2020-05-24 RX ADMIN — IPRATROPIUM BROMIDE AND ALBUTEROL SULFATE SCH ML: .5; 3 SOLUTION RESPIRATORY (INHALATION) at 07:27

## 2020-05-24 RX ADMIN — NYSTATIN SCH DOSE: 100000 POWDER TOPICAL at 19:34

## 2020-05-24 RX ADMIN — DOCUSATE SODIUM SCH MG: 100 CAPSULE, LIQUID FILLED ORAL at 08:54

## 2020-05-24 RX ADMIN — IPRATROPIUM BROMIDE AND ALBUTEROL SULFATE SCH ML: .5; 3 SOLUTION RESPIRATORY (INHALATION) at 13:13

## 2020-05-24 RX ADMIN — SODIUM CHLORIDE SCH UNITS: 4.5 INJECTION, SOLUTION INTRAVENOUS at 08:52

## 2020-05-24 RX ADMIN — SIMVASTATIN SCH MG: 20 TABLET, FILM COATED ORAL at 19:34

## 2020-05-24 RX ADMIN — Medication SCH DOSE: at 19:35

## 2020-05-24 RX ADMIN — ASPIRIN SCH MG: 81 TABLET ORAL at 08:53

## 2020-05-24 RX ADMIN — NICOTINE SCH PATCH: 14 PATCH, EXTENDED RELEASE TRANSDERMAL at 08:53

## 2020-05-24 RX ADMIN — WHITE PETROLATUM SCH DOSE: 57; 17 PASTE TOPICAL at 23:56

## 2020-05-24 RX ADMIN — WHITE PETROLATUM SCH DOSE: 57; 17 PASTE TOPICAL at 00:00

## 2020-05-24 RX ADMIN — WHITE PETROLATUM SCH DOSE: 57; 17 PASTE TOPICAL at 15:27

## 2020-05-24 RX ADMIN — NYSTATIN SCH DOSE: 100000 POWDER TOPICAL at 08:55

## 2020-05-24 RX ADMIN — DOCUSATE SODIUM SCH MG: 100 CAPSULE, LIQUID FILLED ORAL at 19:34

## 2020-05-24 RX ADMIN — WHITE PETROLATUM SCH DOSE: 57; 17 PASTE TOPICAL at 06:06

## 2020-05-24 RX ADMIN — SODIUM CHLORIDE SCH UNITS: 4.5 INJECTION, SOLUTION INTRAVENOUS at 19:34

## 2020-05-24 RX ADMIN — PANTOPRAZOLE SODIUM SCH MG: 40 TABLET, DELAYED RELEASE ORAL at 08:54

## 2020-05-24 RX ADMIN — WHITE PETROLATUM SCH DOSE: 57; 17 PASTE TOPICAL at 12:00

## 2020-05-24 RX ADMIN — Medication SCH DOSE: at 08:55

## 2020-05-24 RX ADMIN — IPRATROPIUM BROMIDE AND ALBUTEROL SULFATE SCH ML: .5; 3 SOLUTION RESPIRATORY (INHALATION) at 19:23

## 2020-05-24 RX ADMIN — LISINOPRIL SCH MG: 2.5 TABLET ORAL at 08:54

## 2020-05-25 VITALS — SYSTOLIC BLOOD PRESSURE: 120 MMHG | DIASTOLIC BLOOD PRESSURE: 67 MMHG

## 2020-05-25 VITALS — DIASTOLIC BLOOD PRESSURE: 73 MMHG | SYSTOLIC BLOOD PRESSURE: 146 MMHG

## 2020-05-25 VITALS — SYSTOLIC BLOOD PRESSURE: 158 MMHG | DIASTOLIC BLOOD PRESSURE: 89 MMHG

## 2020-05-25 LAB
BASOPHILS # BLD AUTO: 0.1 10^3/UL (ref 0–0.2)
BASOPHILS NFR BLD AUTO: 1.3 % (ref 0–1)
BUN SERPL-MCNC: 15 MG/DL (ref 7–18)
CALCIUM SERPL-MCNC: 8.5 MG/DL (ref 8.8–10.2)
CHLORIDE SERPL-SCNC: 107 MEQ/L (ref 98–107)
CO2 SERPL-SCNC: 26 MEQ/L (ref 21–32)
CREAT SERPL-MCNC: 0.8 MG/DL (ref 0.7–1.3)
EOSINOPHIL # BLD AUTO: 0.1 10^3/UL (ref 0–0.5)
EOSINOPHIL NFR BLD AUTO: 2.3 % (ref 0–3)
GFR SERPL CREATININE-BSD FRML MDRD: > 60 ML/MIN/{1.73_M2} (ref 42–?)
GLUCOSE SERPL-MCNC: 90 MG/DL (ref 70–100)
HCT VFR BLD AUTO: 40.4 % (ref 42–52)
HGB BLD-MCNC: 11.6 G/DL (ref 13.5–17.5)
LYMPHOCYTES # BLD AUTO: 1.1 10^3/UL (ref 1.5–5)
LYMPHOCYTES NFR BLD AUTO: 17.6 % (ref 24–44)
MCH RBC QN AUTO: 24.1 PG (ref 27–33)
MCHC RBC AUTO-ENTMCNC: 28.7 G/DL (ref 32–36.5)
MCV RBC AUTO: 84 FL (ref 80–96)
MONOCYTES # BLD AUTO: 0.8 10^3/UL (ref 0–0.8)
MONOCYTES NFR BLD AUTO: 13.4 % (ref 0–5)
NEUTROPHILS # BLD AUTO: 4 10^3/UL (ref 1.5–8.5)
NEUTROPHILS NFR BLD AUTO: 65.1 % (ref 36–66)
PLATELET # BLD AUTO: 241 10^3/UL (ref 150–450)
POTASSIUM SERPL-SCNC: 4.5 MEQ/L (ref 3.5–5.1)
RBC # BLD AUTO: 4.81 10^6/UL (ref 4.3–6.1)
SODIUM SERPL-SCNC: 138 MEQ/L (ref 136–145)
WBC # BLD AUTO: 6.2 10^3/UL (ref 4–10)

## 2020-05-25 RX ADMIN — WHITE PETROLATUM SCH DOSE: 57; 17 PASTE TOPICAL at 12:36

## 2020-05-25 RX ADMIN — NYSTATIN SCH DOSE: 100000 POWDER TOPICAL at 20:36

## 2020-05-25 RX ADMIN — PANTOPRAZOLE SODIUM SCH MG: 40 TABLET, DELAYED RELEASE ORAL at 07:47

## 2020-05-25 RX ADMIN — SIMVASTATIN SCH MG: 20 TABLET, FILM COATED ORAL at 20:36

## 2020-05-25 RX ADMIN — SENNOSIDES SCH TAB: 8.6 TABLET, FILM COATED ORAL at 20:38

## 2020-05-25 RX ADMIN — NICOTINE SCH PATCH: 14 PATCH, EXTENDED RELEASE TRANSDERMAL at 07:49

## 2020-05-25 RX ADMIN — WHITE PETROLATUM SCH DOSE: 57; 17 PASTE TOPICAL at 20:37

## 2020-05-25 RX ADMIN — DOCUSATE SODIUM SCH MG: 100 CAPSULE, LIQUID FILLED ORAL at 07:48

## 2020-05-25 RX ADMIN — IPRATROPIUM BROMIDE AND ALBUTEROL SULFATE SCH ML: .5; 3 SOLUTION RESPIRATORY (INHALATION) at 07:14

## 2020-05-25 RX ADMIN — DOCUSATE SODIUM SCH MG: 100 CAPSULE, LIQUID FILLED ORAL at 20:35

## 2020-05-25 RX ADMIN — WHITE PETROLATUM SCH DOSE: 57; 17 PASTE TOPICAL at 16:55

## 2020-05-25 RX ADMIN — Medication SCH DOSE: at 07:49

## 2020-05-25 RX ADMIN — IPRATROPIUM BROMIDE AND ALBUTEROL SULFATE SCH ML: .5; 3 SOLUTION RESPIRATORY (INHALATION) at 13:50

## 2020-05-25 RX ADMIN — SODIUM CHLORIDE SCH UNITS: 4.5 INJECTION, SOLUTION INTRAVENOUS at 20:36

## 2020-05-25 RX ADMIN — NYSTATIN SCH DOSE: 100000 POWDER TOPICAL at 07:49

## 2020-05-25 RX ADMIN — Medication SCH DOSE: at 20:36

## 2020-05-25 RX ADMIN — SODIUM CHLORIDE SCH UNITS: 4.5 INJECTION, SOLUTION INTRAVENOUS at 07:47

## 2020-05-25 RX ADMIN — ASPIRIN SCH MG: 81 TABLET ORAL at 07:48

## 2020-05-25 RX ADMIN — WHITE PETROLATUM SCH DOSE: 57; 17 PASTE TOPICAL at 06:00

## 2020-05-25 RX ADMIN — IPRATROPIUM BROMIDE AND ALBUTEROL SULFATE SCH ML: .5; 3 SOLUTION RESPIRATORY (INHALATION) at 19:16

## 2020-05-25 RX ADMIN — LISINOPRIL SCH MG: 2.5 TABLET ORAL at 07:48

## 2020-05-26 VITALS — SYSTOLIC BLOOD PRESSURE: 117 MMHG | DIASTOLIC BLOOD PRESSURE: 62 MMHG

## 2020-05-26 VITALS — SYSTOLIC BLOOD PRESSURE: 110 MMHG | DIASTOLIC BLOOD PRESSURE: 67 MMHG

## 2020-05-26 VITALS — DIASTOLIC BLOOD PRESSURE: 96 MMHG | SYSTOLIC BLOOD PRESSURE: 143 MMHG

## 2020-05-26 VITALS — DIASTOLIC BLOOD PRESSURE: 65 MMHG | SYSTOLIC BLOOD PRESSURE: 136 MMHG

## 2020-05-26 VITALS — DIASTOLIC BLOOD PRESSURE: 62 MMHG | SYSTOLIC BLOOD PRESSURE: 131 MMHG

## 2020-05-26 RX ADMIN — WHITE PETROLATUM SCH DOSE: 57; 17 PASTE TOPICAL at 17:04

## 2020-05-26 RX ADMIN — PANTOPRAZOLE SODIUM SCH MG: 40 TABLET, DELAYED RELEASE ORAL at 08:51

## 2020-05-26 RX ADMIN — DOCUSATE SODIUM SCH MG: 100 CAPSULE, LIQUID FILLED ORAL at 08:55

## 2020-05-26 RX ADMIN — IPRATROPIUM BROMIDE AND ALBUTEROL SULFATE SCH ML: .5; 3 SOLUTION RESPIRATORY (INHALATION) at 13:06

## 2020-05-26 RX ADMIN — WHITE PETROLATUM SCH DOSE: 57; 17 PASTE TOPICAL at 20:57

## 2020-05-26 RX ADMIN — Medication SCH DOSE: at 08:53

## 2020-05-26 RX ADMIN — SIMVASTATIN SCH MG: 20 TABLET, FILM COATED ORAL at 20:55

## 2020-05-26 RX ADMIN — WHITE PETROLATUM SCH DOSE: 57; 17 PASTE TOPICAL at 06:34

## 2020-05-26 RX ADMIN — IPRATROPIUM BROMIDE AND ALBUTEROL SULFATE SCH ML: .5; 3 SOLUTION RESPIRATORY (INHALATION) at 20:56

## 2020-05-26 RX ADMIN — SODIUM CHLORIDE SCH UNITS: 4.5 INJECTION, SOLUTION INTRAVENOUS at 20:54

## 2020-05-26 RX ADMIN — ASPIRIN SCH MG: 81 TABLET ORAL at 08:52

## 2020-05-26 RX ADMIN — LISINOPRIL SCH MG: 2.5 TABLET ORAL at 08:52

## 2020-05-26 RX ADMIN — SENNOSIDES SCH TAB: 8.6 TABLET, FILM COATED ORAL at 20:56

## 2020-05-26 RX ADMIN — Medication SCH DOSE: at 20:57

## 2020-05-26 RX ADMIN — WHITE PETROLATUM SCH DOSE: 57; 17 PASTE TOPICAL at 12:01

## 2020-05-26 RX ADMIN — NICOTINE SCH PATCH: 14 PATCH, EXTENDED RELEASE TRANSDERMAL at 08:54

## 2020-05-26 RX ADMIN — SODIUM CHLORIDE SCH UNITS: 4.5 INJECTION, SOLUTION INTRAVENOUS at 08:52

## 2020-05-26 RX ADMIN — NYSTATIN SCH DOSE: 100000 POWDER TOPICAL at 08:53

## 2020-05-26 RX ADMIN — DOCUSATE SODIUM SCH MG: 100 CAPSULE, LIQUID FILLED ORAL at 08:52

## 2020-05-26 RX ADMIN — IPRATROPIUM BROMIDE AND ALBUTEROL SULFATE SCH ML: .5; 3 SOLUTION RESPIRATORY (INHALATION) at 07:18

## 2020-05-26 RX ADMIN — DOCUSATE SODIUM SCH MG: 100 CAPSULE, LIQUID FILLED ORAL at 20:54

## 2020-05-26 RX ADMIN — NYSTATIN SCH DOSE: 100000 POWDER TOPICAL at 20:56

## 2020-05-26 NOTE — REPVR
PROCEDURE INFORMATION: 

Exam: CT Head Without Contrast 

Exam date and time: 5/26/2020 4:30 AM 

Age: 69 years old 

Clinical indication: Other: Muscle twitches; Additional info: ? Stroke, HX 

hemorrhagic stroke 



TECHNIQUE: 

Imaging protocol: Computed tomography of the head without contrast. 

Radiation optimization: All CT scans at this facility use at least one of these 

dose optimization techniques: automated exposure control; mA and/or kV 

adjustment per patient size (includes targeted exams where dose is matched to 

clinical indication); or iterative reconstruction. 



COMPARISON: 

CT Head without contrast 5/17/2020 5:07 PM 



FINDINGS: 

Brain: Focal area of vasogenic edema with resolving parenchymal hemorrhage 

along the left parafalcine vertex. Suggest postcontrast examination to exclude 

an underlying mass lesion. Generalized parenchymal atrophy and evidence of 

microvascular ischemic disease involving the periventricular and subcortical 

white matter bilaterally. 

Ventricles: Normal. No ventriculomegaly. 

Bones/joints: Unremarkable. No acute fracture. 

Sinuses: Small retention cyst in the right maxillary sinus. 

Mastoid air cells: Visualized mastoid air cells are well aerated. 



Soft tissues: Unremarkable. 



IMPRESSION: 

Focal area of vasogenic edema with resolving parenchymal hemorrhage along the 

left parafalcine vertex. Suggest postcontrast examination to exclude an 

underlying mass lesion. 



Electronically signed by: Brody Madrid On 05/26/2020  05:03:57 AM

## 2020-05-26 NOTE — IPNPDOC
PM&R Progress Note


DATE OF SERVICE:  May 26, 2020


Physiatrist Progress Note


Subjective:


Patient stating his abdomen and right leg was shaky early this morning and 

reminded him on his symptoms on the day of his stroke. He reports he has had 5 

or 6 episodes like this even prior to the stroke that began a month ago. He 

denies pain with this or loss of consciousness.





REVIEW OF SYSTEMS: The following is a completed review of systems and has been 

reviewed. Review of systems otherwise unremarkable.


PAIN: Patient self reports no pain


EYES: No recent vision changes


EARS, NOSE, & THROAT: No throat pain, or dysphagia, or rhinorrhea


CARDIOVASCULAR: Denies chest pain or palpitations


PULMONARY: Denies shortness of breath


GASTROINTESTINAL: Denies constipation/diarrhea


GENITOURINARY: denies dysuria


MUSCULOSKELETAL: generalized weakness


NEUROLOGICAL: mild right sided paresis


HEMATOLOGICAL: denies easy bruising


SKIN: sacral ulcer and bilat LE hyperpigmentation


PSYCHIATRIC: Unremarkable.


All other review of systems found to be negative.

















PHYSICAL EXAMINATION:


VITAL SIGNS: Please see below.


GENERAL: Pleasant and cooperative. No acute distress. 


HEENT: PERRL. Extraocular movements intact. Clear conjunctiva, no facial droop


CARDIOVASCULAR: Regular rate and rhythm. No murmurs, rubs, or gallops


LUNGS: +scattered rhonchi


ABDOMEN: Soft, nontender, nondistended. Positive bowel sounds. Normal active 

bowel sounds


NEUROLOGICAL: Alert and oriented times three. Cranial nerves II through XII 

grossly intact. Sensation grossly intact in all 4 limbs


EXTREMITIES: 5\5 strength left UE, 4+ RUE with mild pronator drift, 4+\5 

strength right lower extremity. 5/5 strength in left lower extremity. 





SKIN: large sacral and posterior thigh hyperpigmentation, scar tissue vs large 

DTI?, intertriginous erythema, bilat calves with venous stasis changes and 

hyperkeratotic 

















ASSESSMENT:69-year-old M with past medical history of Afib and chronic RLE DVT 

who presents status post  hemorrhagic stroke





PLAN:


1. Rehab- PT/OT advnace gait and ADLs, strengthen/stretch/maintain ROM all 4 

limbs


SLP- cog eval, patient was tolerating regular diet, however will downgrade to 

level 3 until SLP evaluates him here


2. Neuro: s/p left posterior frontal intraparenchymal hemorrhage with right 

sided paresis, Warfarin on hold to be cleared by neurosurgery


-possible focal motor seizure activity at 4:30 in the morning? patient reporting

his abdomen and RLE shook for 5 minutes and that this had started about one 

month prior to his stroke on 5/6 occasions, CTH ordered overnight showing no 

acute bleed, will discuss case with neurology


-c/u ASA, statin for secondary stroke prevention and optimize BP control


-c/u prozac for motor recovery


2. CArdiac: hx of Afib- coumadin on hold, c/u betablocker


-recent ECHO 5-18-20 showing, "left ventricle size is normal. Left ventricular 

wall thickness is 


normal. Overall LV systolic function is in the low normal range. The calculated 

left ventricular ejection fraction is 51.4% . Indeterminate LV diastolic 

function." will monitor for fluid overload, medicine consulted to assist in 

overall management


-HLD c/u Zocor


-HTn c/u lisinoprol


3. Endo: A1c 5.4% on 5-17-20 will hold off on continuing metformin and monitor 

FS


4. Resp: current smoker, c/u NicoDerm patch, Duonebs, guaifenesin, and incentive

spirometry, monitor for infection


6. Skin: patient with large sacral/thigh ecchymosis and scar tissue, unclear if 

deep tissue injury as per [facility] notes appears chronic- will c/u barrier cream, 

turn q2 in bed, and avoid elevation of head of bed


-heel floats/optifoam to bilat heels


-venous stasis changes to bilat LE- c/u vanicream


-intertriginous rash c/u nystatin powder 


7. Pain: tylenol and oxycodone (will increase to q4h prn)


8. DVT ppx: patient with known chronic right sided DVT, recent Doppler 5-19-20, 

"Chronic non occlusive deep venous thrombosis of proximal


to distal right popliteal vein." c/u heparin 


9. GI ppx; protonix


10. : monitor PVRs


11. Dispo: tbd


Allergies


Coded Allergies:  


     Sulfa (Sulfonamide Antibiotics) (Verified  Allergy, Intermediate, rash, 

5/17/20)





Vital Signs





Vital Signs








  Date Time  Temp Pulse Resp B/P (MAP) Pulse Ox O2 Delivery O2 Flow Rate FiO2


 


5/26/20 12:40   18     


 


5/26/20 08:52  63  131/62    


 


5/26/20 06:30 97.0    93 Room Air  











Laboratory Data


Labs 24H


Laboratory Tests 2


5/25/20 16:39: Bedside Glucose (Misc Panel) 97


5/26/20 03:58: Bedside Glucose (Misc Panel) 121H


5/26/20 06:17: Bedside Glucose (Misc Panel) 110





Current Medications


Current Medications





Current Medications








 Medications


  (Trade)  Dose


 Ordered  Sig/Ministerio


 Route


 PRN Reason  Start Time


 Stop Time Status Last Admin


Dose Admin


 


 Acetaminophen


  (Tylenol Tab)  650 mg  Q4HP  PRN


 PO


 fever/MILD PAIN (PS 1-4)  5/20/20 12:30


    5/22/20 13:08





 


 Albuterol/


 Ipratropium


  (Duoneb (Ipr


 0.5mg/Alb 2.5mg))  3 ml  RTID


 NEB


   5/20/20 14:00


    5/26/20 13:06





 


 Aspirin


  (Ecotrin)  81 mg  DAILY


 PO


   5/21/20 09:00


    5/26/20 08:52





 


 Carvedilol


  (COReg)  12.5 mg  BID


 PO


   5/20/20 21:00


    5/26/20 08:52





 


 Dextrose


  (Dextrose 50%)  25 ml  ASDIRECTED  PRN


 IV


 SEE LABEL COMMENTS  5/20/20 12:30


     





 


 Docusate Sodium


  (Colace)  100 mg  BID


 PO


   5/20/20 21:00


    5/25/20 20:35





 


 Emollient Cream


  (Vanicream)  1 dose  BID


 TOP


   5/20/20 21:00


    5/26/20 08:53





 


 Fluoxetine HCl


  (PROzac)  40 mg  DAILY


 PO


   5/21/20 09:00


    5/26/20 08:52





 


 Glucagon


  (Glucagon)  1 mg  ASDIRECTED  PRN


 SC


 SEE LABEL COMMENTS  5/20/20 12:30


     





 


 Glucose


  (Glucose)  16 GM  ASDIRECTED  PRN


 PO


 SEE LABEL COMMENTS  5/20/20 12:30


     





 


 Guaifenesin


  (Robitussin Tab)  400 mg  TID


 PO


   5/20/20 16:00


    5/26/20 08:52





 


 Heparin Sodium


  (Porcine)


  (Heparin)  5,000 units  Q12H


 SC


   5/20/20 21:00


    5/26/20 08:52





 


 Home Med


  (Med Rec


 Complete!)    ASDIRECTED


 XX


   5/20/20 13:30


 5/20/20 13:31 DC  





 


 Insulin Human


 Lispro


  (HumaLOG INSULIN)  SEE


 PROTOCOL


 TABLE  AC


 SC


   5/20/20 12:00


 5/20/20 13:22 DC  





 


 Insulin Human


 Lispro


  (HumaLOG INSULIN)  SEE


 PROTOCOL


 TABLE  QHS


 SC


   5/20/20 21:00


 5/20/20 13:22 DC  





 


 Lisinopril


  (Prinivil)  2.5 mg  DAILY


 PO


   5/21/20 09:00


    5/26/20 08:52





 


 Magnesium


 Hydroxide


  (Milk Of


 Magnesia)  30 ml  DAILYPRN  PRN


 PO


 CONSTIPATION  5/20/20 12:30


     





 


 Nicotine


  (Nicoderm Cq


 14mg)  1 patch  DAILY


 TD


   5/21/20 09:00


    5/26/20 08:54





 


 Nystatin


  (Mycostatin


 Powder, Nystop)  apply to


 abdominal


 fo...  BID


 TOP


   5/20/20 21:00


    5/26/20 08:53





 


 Oxycodone HCl


  (Roxicodone,


 Oxyir)  5 mg  Q4H  PRN


 PO


 PAIN  5/21/20 12:45


    5/26/20 12:01





 


 Oxycodone HCl


  (Roxicodone,


 Oxyir)  5 mg  Q6HP  PRN


 PO


 PAIN  5/20/20 12:30


 5/21/20 12:31 DC 5/21/20 11:21





 


 Pantoprazole


 Sodium


  (Protonix)  40 mg  DAILY


 PO


   5/21/20 09:00


    5/26/20 08:51





 


 Senna


  (Senokot)  1 tab  QHS


 PO


   5/20/20 21:00


    5/23/20 20:58





 


 Simvastatin


  (Zocor)  20 mg  QHS


 PO


   5/20/20 21:00


    5/25/20 20:36




















STEPHANE FERNANDO MD         May 26, 2020 15:18

## 2020-05-27 VITALS — SYSTOLIC BLOOD PRESSURE: 125 MMHG | DIASTOLIC BLOOD PRESSURE: 63 MMHG

## 2020-05-27 VITALS — SYSTOLIC BLOOD PRESSURE: 127 MMHG | DIASTOLIC BLOOD PRESSURE: 51 MMHG

## 2020-05-27 VITALS — DIASTOLIC BLOOD PRESSURE: 56 MMHG | SYSTOLIC BLOOD PRESSURE: 121 MMHG

## 2020-05-27 LAB
BASOPHILS # BLD AUTO: 0.1 10^3/UL (ref 0–0.2)
BASOPHILS NFR BLD AUTO: 1.2 % (ref 0–1)
BUN SERPL-MCNC: 16 MG/DL (ref 7–18)
CALCIUM SERPL-MCNC: 8.7 MG/DL (ref 8.8–10.2)
CHLORIDE SERPL-SCNC: 107 MEQ/L (ref 98–107)
CO2 SERPL-SCNC: 27 MEQ/L (ref 21–32)
CREAT SERPL-MCNC: 0.83 MG/DL (ref 0.7–1.3)
EOSINOPHIL # BLD AUTO: 0.2 10^3/UL (ref 0–0.5)
EOSINOPHIL NFR BLD AUTO: 3.4 % (ref 0–3)
GFR SERPL CREATININE-BSD FRML MDRD: > 60 ML/MIN/{1.73_M2} (ref 42–?)
GLUCOSE SERPL-MCNC: 108 MG/DL (ref 70–100)
HCT VFR BLD AUTO: 38.4 % (ref 42–52)
HGB BLD-MCNC: 11.1 G/DL (ref 13.5–17.5)
LYMPHOCYTES # BLD AUTO: 1.1 10^3/UL (ref 1.5–5)
LYMPHOCYTES NFR BLD AUTO: 16.5 % (ref 24–44)
MCH RBC QN AUTO: 24.2 PG (ref 27–33)
MCHC RBC AUTO-ENTMCNC: 28.9 G/DL (ref 32–36.5)
MCV RBC AUTO: 83.8 FL (ref 80–96)
MONOCYTES # BLD AUTO: 0.8 10^3/UL (ref 0–0.8)
MONOCYTES NFR BLD AUTO: 12.2 % (ref 0–5)
NEUTROPHILS # BLD AUTO: 4.4 10^3/UL (ref 1.5–8.5)
NEUTROPHILS NFR BLD AUTO: 66.5 % (ref 36–66)
PLATELET # BLD AUTO: 227 10^3/UL (ref 150–450)
POTASSIUM SERPL-SCNC: 4.3 MEQ/L (ref 3.5–5.1)
RBC # BLD AUTO: 4.58 10^6/UL (ref 4.3–6.1)
SODIUM SERPL-SCNC: 138 MEQ/L (ref 136–145)
WBC # BLD AUTO: 6.6 10^3/UL (ref 4–10)

## 2020-05-27 RX ADMIN — IPRATROPIUM BROMIDE AND ALBUTEROL SULFATE SCH ML: .5; 3 SOLUTION RESPIRATORY (INHALATION) at 20:44

## 2020-05-27 RX ADMIN — ASPIRIN SCH MG: 81 TABLET ORAL at 09:50

## 2020-05-27 RX ADMIN — Medication SCH DOSE: at 21:31

## 2020-05-27 RX ADMIN — SENNOSIDES SCH TAB: 8.6 TABLET, FILM COATED ORAL at 21:00

## 2020-05-27 RX ADMIN — PANTOPRAZOLE SODIUM SCH MG: 40 TABLET, DELAYED RELEASE ORAL at 09:50

## 2020-05-27 RX ADMIN — DOCUSATE SODIUM SCH MG: 100 CAPSULE, LIQUID FILLED ORAL at 09:50

## 2020-05-27 RX ADMIN — SODIUM CHLORIDE SCH UNITS: 4.5 INJECTION, SOLUTION INTRAVENOUS at 09:49

## 2020-05-27 RX ADMIN — SIMVASTATIN SCH MG: 20 TABLET, FILM COATED ORAL at 21:25

## 2020-05-27 RX ADMIN — SODIUM CHLORIDE SCH UNITS: 4.5 INJECTION, SOLUTION INTRAVENOUS at 21:28

## 2020-05-27 RX ADMIN — LISINOPRIL SCH MG: 2.5 TABLET ORAL at 09:49

## 2020-05-27 RX ADMIN — NYSTATIN SCH DOSE: 100000 POWDER TOPICAL at 21:31

## 2020-05-27 RX ADMIN — NYSTATIN SCH DOSE: 100000 POWDER TOPICAL at 09:51

## 2020-05-27 RX ADMIN — Medication SCH DOSE: at 09:51

## 2020-05-27 RX ADMIN — WHITE PETROLATUM SCH DOSE: 57; 17 PASTE TOPICAL at 11:49

## 2020-05-27 RX ADMIN — WHITE PETROLATUM SCH DOSE: 57; 17 PASTE TOPICAL at 05:07

## 2020-05-27 RX ADMIN — IPRATROPIUM BROMIDE AND ALBUTEROL SULFATE SCH ML: .5; 3 SOLUTION RESPIRATORY (INHALATION) at 07:28

## 2020-05-27 RX ADMIN — IPRATROPIUM BROMIDE AND ALBUTEROL SULFATE SCH ML: .5; 3 SOLUTION RESPIRATORY (INHALATION) at 12:58

## 2020-05-27 RX ADMIN — WHITE PETROLATUM SCH DOSE: 57; 17 PASTE TOPICAL at 18:00

## 2020-05-27 RX ADMIN — LEVETIRACETAM SCH MG: 250 TABLET, FILM COATED ORAL at 21:28

## 2020-05-27 RX ADMIN — NICOTINE SCH PATCH: 14 PATCH, EXTENDED RELEASE TRANSDERMAL at 09:50

## 2020-05-27 RX ADMIN — DOCUSATE SODIUM SCH MG: 100 CAPSULE, LIQUID FILLED ORAL at 21:00

## 2020-05-27 NOTE — IPNPDOC
PM&R Progress Note


DATE OF SERVICE:  May 27, 2020


Physiatrist Progress Note





Subjective:


Patient reporting he is agreeable to starting an antiseizure medication. He is 

eager to go home Friday.





 


REVIEW OF SYSTEMS: The following is a completed review of systems and has been 

reviewed. Review of systems otherwise unremarkable.


PAIN: Patient self reports no pain


EYES: No recent vision changes


EARS, NOSE, & THROAT: No throat pain, or dysphagia, or rhinorrhea


CARDIOVASCULAR: Denies chest pain or palpitations


PULMONARY: Denies shortness of breath


GASTROINTESTINAL: Denies constipation/diarrhea


GENITOURINARY: denies dysuria


MUSCULOSKELETAL: generalized weakness


NEUROLOGICAL: mild right sided paresis


HEMATOLOGICAL: denies easy bruising


SKIN: sacral ulcer and bilat LE hyperpigmentation


PSYCHIATRIC: Unremarkable.


All other review of systems found to be negative.

















PHYSICAL EXAMINATION:


VITAL SIGNS: Please see below.


GENERAL: Pleasant and cooperative. No acute distress. 


HEENT: PERRL. Extraocular movements intact. Clear conjunctiva, no facial droop


CARDIOVASCULAR: Regular rate and rhythm. No murmurs, rubs, or gallops


LUNGS: +scattered rhonchi


ABDOMEN: Soft, nontender, nondistended. Positive bowel sounds. Normal active 

bowel sounds


NEUROLOGICAL: Alert and oriented times three. Cranial nerves II through XII 

grossly intact. Sensation grossly intact in all 4 limbs


EXTREMITIES: 5\5 strength left UE, 4+ RUE with mild pronator drift, 4+\5 

strength right lower extremity. 5/5 strength in left lower extremity. 





SKIN: large sacral and posterior thigh hyperpigmentation, scar tissue vs large 

DTI?, intertriginous erythema, bilat calves with venous stasis changes and 

hyperkeratotic 

















ASSESSMENT:69-year-old M with past medical history of Afib and chronic RLE DVT 

who presents status post  hemorrhagic stroke





PLAN:


1. Rehab- PT/OT advnace gait and ADLs, strengthen/stretch/maintain ROM all 4 

limbs


SLP- cog eval, patient was tolerating regular diet, however will downgrade to 

level 3 until SLP evaluates him here


2. Neuro: s/p left posterior frontal intraparenchymal hemorrhage with right 

sided paresis, Warfarin on hold to be cleared by neurosurgery


-possible focal motor seizure activity 5/26/20? patient reporting his abdomen 

and RLE shook for 5 minutes and that this had started about one month prior to 

his stroke on 5/6 occasions, CTH showing no acute bleed,  discussed case with 

neurology and will start oral Keppra with outpatient neuro f/u, no further 

episodes of shaking


-there was concnern on CT imaging both at [facility] and [facility] for possible underlying 

brain mass, MRI with and without contrast performed at [facility] which was negative, 

this may need to be repeated per neuro outpatient recs


-c/u ASA, statin for secondary stroke prevention and optimize BP control


-c/u prozac for motor recovery


2. CArdiac: hx of Afib- coumadin on hold, c/u betablocker


-recent ECHO 5-18-20 showing, "left ventricle size is normal. Left ventricular 

wall thickness is 


normal. Overall LV systolic function is in the low normal range. The calculated 

left ventricular ejection fraction is 51.4% . Indeterminate LV diastolic 

function." will monitor for fluid overload, medicine consulted to assist in 

overall management


-HLD c/u Zocor


-HTn c/u lisinoprol


3. Endo: A1c 5.4% on 5-17-20 will hold off on continuing metformin and monitor 

FS


4. Resp: current smoker, c/u NicoDerm patch, Duonebs, guaifenesin, and incentive

spirometry, monitor for infection


6. Skin: patient with large sacral/thigh ecchymosis and scar tissue, unclear if 

deep tissue injury as per [facility] notes appears chronic- will c/u barrier cream, 

turn q2 in bed, and avoid elevation of head of bed


-heel floats/optifoam to bilat heels


-venous stasis changes to bilat LE- c/u vanicream


-intertriginous rash c/u nystatin powder 


7. Pain: tylenol and oxycodone (will increase to q4h prn)


8. DVT ppx: patient with known chronic right sided DVT, recent Doppler 5-19-20, 

"Chronic non occlusive deep venous thrombosis of proximal


to distal right popliteal vein." c/u heparin 


9. GI ppx; protonix


10. : monitor PVRs


11. Dispo:possibly to home 5/29/20 as patient nearing his baseline


Allergies


Coded Allergies:  


     Sulfa (Sulfonamide Antibiotics) (Verified  Allergy, Intermediate, rash, 

5/17/20)





Vital Signs





Vital Signs








  Date Time  Temp Pulse Resp B/P (MAP) Pulse Ox O2 Delivery O2 Flow Rate FiO2


 


5/27/20 15:37   16     


 


5/27/20 14:00 97.9 88  127/51 (76) 96 Room Air  











Laboratory Data


CBC/BMP


Laboratory Tests


5/27/20 05:55








Labs 24H


Laboratory Tests 2


5/27/20 05:55: 


Immature Granulocyte % (Auto) 0.2, Neutrophils (%) (Auto) 66.5H, Lymphocytes (%)

(Auto) 16.5L, Monocytes (%) (Auto) 12.2H, Eosinophils (%) (Auto) 3.4H, Basophils

(%) (Auto) 1.2H, Neutrophils # (Auto) 4.4, Lymphocytes # (Auto) 1.1L, Monocytes 

# (Auto) 0.8, Eosinophils # (Auto) 0.2, Basophils # (Auto) 0.1, Nucleated Red 

Blood Cells % (auto) 0.0, Anion Gap 4L, Glomerular Filtration Rate > 60.0, 

Calcium Level 8.7L


5/27/20 11:45: Bedside Glucose (Misc Panel) 92


5/27/20 16:50: Bedside Glucose (Misc Panel) 93





Current Medications


Current Medications





Current Medications








 Medications


  (Trade)  Dose


 Ordered  Sig/Ministerio


 Route


 PRN Reason  Start Time


 Stop Time Status Last Admin


Dose Admin


 


 Acetaminophen


  (Tylenol Tab)  650 mg  Q4HP  PRN


 PO


 fever/MILD PAIN (PS 1-4)  5/20/20 12:30


    5/22/20 13:08





 


 Albuterol/


 Ipratropium


  (Duoneb (Ipr


 0.5mg/Alb 2.5mg))  3 ml  RTID


 NEB


   5/20/20 14:00


    5/27/20 12:58





 


 Aspirin


  (Ecotrin)  81 mg  DAILY


 PO


   5/21/20 09:00


    5/27/20 09:50





 


 Carvedilol


  (COReg)  12.5 mg  BID


 PO


   5/20/20 21:00


    5/27/20 09:49





 


 Dextrose


  (Dextrose 50%)  25 ml  ASDIRECTED  PRN


 IV


 SEE LABEL COMMENTS  5/20/20 12:30


     





 


 Docusate Sodium


  (Colace)  100 mg  BID


 PO


   5/20/20 21:00


    5/27/20 09:50





 


 Emollient Cream


  (Vanicream)  1 dose  BID


 TOP


   5/20/20 21:00


    5/27/20 09:51





 


 Fluoxetine HCl


  (PROzac)  40 mg  DAILY


 PO


   5/21/20 09:00


    5/27/20 09:50





 


 Glucagon


  (Glucagon)  1 mg  ASDIRECTED  PRN


 SC


 SEE LABEL COMMENTS  5/20/20 12:30


     





 


 Glucose


  (Glucose)  16 GM  ASDIRECTED  PRN


 PO


 SEE LABEL COMMENTS  5/20/20 12:30


     





 


 Guaifenesin


  (Robitussin Tab)  400 mg  TID


 PO


   5/20/20 16:00


    5/27/20 16:25





 


 Heparin Sodium


  (Porcine)


  (Heparin)  5,000 units  Q12H


 SC


   5/20/20 21:00


    5/27/20 09:49





 


 Home Med


  (Med Rec


 Complete!)    ASDIRECTED


 XX


   5/20/20 13:30


 5/20/20 13:31 DC  





 


 Insulin Human


 Lispro


  (HumaLOG INSULIN)  SEE


 PROTOCOL


 TABLE  AC


 SC


   5/20/20 12:00


 5/20/20 13:22 DC  





 


 Insulin Human


 Lispro


  (HumaLOG INSULIN)  SEE


 PROTOCOL


 TABLE  QHS


 SC


   5/20/20 21:00


 5/20/20 13:22 DC  





 


 Levetiracetam


  (Keppra)  750 mg  BID


 PO


   5/27/20 21:00


     





 


 Lisinopril


  (Prinivil)  2.5 mg  DAILY


 PO


   5/21/20 09:00


    5/27/20 09:49





 


 Magnesium


 Hydroxide


  (Milk Of


 Magnesia)  30 ml  DAILYPRN  PRN


 PO


 CONSTIPATION  5/20/20 12:30


     





 


 Nicotine


  (Nicoderm Cq


 14mg)  1 patch  DAILY


 TD


   5/21/20 09:00


    5/27/20 09:50





 


 Nystatin


  (Mycostatin


 Powder, Nystop)  apply to


 abdominal


 fo...  BID


 TOP


   5/20/20 21:00


    5/27/20 09:51





 


 Oxycodone HCl


  (Roxicodone,


 Oxyir)  5 mg  Q4H  PRN


 PO


 PAIN  5/21/20 12:45


    5/27/20 15:07





 


 Oxycodone HCl


  (Roxicodone,


 Oxyir)  5 mg  Q6HP  PRN


 PO


 PAIN  5/20/20 12:30


 5/21/20 12:31 DC 5/21/20 11:21





 


 Pantoprazole


 Sodium


  (Protonix)  40 mg  DAILY


 PO


   5/21/20 09:00


    5/27/20 09:50





 


 Senna


  (Senokot)  1 tab  QHS


 PO


   5/20/20 21:00


    5/26/20 20:56





 


 Simvastatin


  (Zocor)  20 mg  QHS


 PO


   5/20/20 21:00


    5/26/20 20:55




















STEPHANE FERNANDO MD         May 27, 2020 17:11

## 2020-05-28 VITALS — DIASTOLIC BLOOD PRESSURE: 58 MMHG | SYSTOLIC BLOOD PRESSURE: 114 MMHG

## 2020-05-28 VITALS — SYSTOLIC BLOOD PRESSURE: 122 MMHG | DIASTOLIC BLOOD PRESSURE: 70 MMHG

## 2020-05-28 VITALS — SYSTOLIC BLOOD PRESSURE: 116 MMHG | DIASTOLIC BLOOD PRESSURE: 69 MMHG

## 2020-05-28 RX ADMIN — IPRATROPIUM BROMIDE AND ALBUTEROL SULFATE SCH ML: .5; 3 SOLUTION RESPIRATORY (INHALATION) at 07:19

## 2020-05-28 RX ADMIN — DOCUSATE SODIUM SCH MG: 100 CAPSULE, LIQUID FILLED ORAL at 09:00

## 2020-05-28 RX ADMIN — IPRATROPIUM BROMIDE AND ALBUTEROL SULFATE SCH ML: .5; 3 SOLUTION RESPIRATORY (INHALATION) at 13:36

## 2020-05-28 RX ADMIN — LISINOPRIL SCH MG: 2.5 TABLET ORAL at 09:59

## 2020-05-28 RX ADMIN — WHITE PETROLATUM SCH DOSE: 57; 17 PASTE TOPICAL at 23:53

## 2020-05-28 RX ADMIN — ASPIRIN SCH MG: 81 TABLET ORAL at 09:58

## 2020-05-28 RX ADMIN — SODIUM CHLORIDE SCH UNITS: 4.5 INJECTION, SOLUTION INTRAVENOUS at 21:07

## 2020-05-28 RX ADMIN — WHITE PETROLATUM SCH DOSE: 57; 17 PASTE TOPICAL at 10:01

## 2020-05-28 RX ADMIN — Medication SCH DOSE: at 21:08

## 2020-05-28 RX ADMIN — Medication SCH DOSE: at 10:01

## 2020-05-28 RX ADMIN — PANTOPRAZOLE SODIUM SCH MG: 40 TABLET, DELAYED RELEASE ORAL at 09:59

## 2020-05-28 RX ADMIN — WHITE PETROLATUM SCH DOSE: 57; 17 PASTE TOPICAL at 06:00

## 2020-05-28 RX ADMIN — NICOTINE SCH PATCH: 14 PATCH, EXTENDED RELEASE TRANSDERMAL at 10:00

## 2020-05-28 RX ADMIN — DOCUSATE SODIUM SCH MG: 100 CAPSULE, LIQUID FILLED ORAL at 21:00

## 2020-05-28 RX ADMIN — SENNOSIDES SCH TAB: 8.6 TABLET, FILM COATED ORAL at 21:00

## 2020-05-28 RX ADMIN — SODIUM CHLORIDE SCH UNITS: 4.5 INJECTION, SOLUTION INTRAVENOUS at 09:59

## 2020-05-28 RX ADMIN — LEVETIRACETAM SCH MG: 250 TABLET, FILM COATED ORAL at 21:00

## 2020-05-28 RX ADMIN — WHITE PETROLATUM SCH DOSE: 57; 17 PASTE TOPICAL at 00:00

## 2020-05-28 RX ADMIN — NYSTATIN SCH DOSE: 100000 POWDER TOPICAL at 21:08

## 2020-05-28 RX ADMIN — SIMVASTATIN SCH MG: 20 TABLET, FILM COATED ORAL at 21:05

## 2020-05-28 RX ADMIN — LEVETIRACETAM SCH MG: 250 TABLET, FILM COATED ORAL at 09:58

## 2020-05-28 RX ADMIN — NYSTATIN SCH DOSE: 100000 POWDER TOPICAL at 10:00

## 2020-05-28 RX ADMIN — WHITE PETROLATUM SCH DOSE: 57; 17 PASTE TOPICAL at 17:47

## 2020-05-28 NOTE — IPNPDOC
PM&R Progress Note


DATE OF SERVICE:  May 28, 2020


Physiatrist Progress Note


Subjective:


Patient reports he feels well today, but is a little more tired since starting 

Keppra. His new medication was explained to his wife over the phone. 





 


REVIEW OF SYSTEMS: The following is a completed review of systems and has been 

reviewed. Review of systems otherwise unremarkable.


PAIN: Patient self reports no pain


EYES: No recent vision changes


EARS, NOSE, & THROAT: No throat pain, or dysphagia, or rhinorrhea


CARDIOVASCULAR: Denies chest pain or palpitations


PULMONARY: Denies shortness of breath


GASTROINTESTINAL: Denies constipation/diarrhea


GENITOURINARY: denies dysuria


MUSCULOSKELETAL: generalized weakness


NEUROLOGICAL: mild right sided paresis


HEMATOLOGICAL: denies easy bruising


SKIN: sacral ulcer and bilat LE hyperpigmentation


PSYCHIATRIC: Unremarkable.


All other review of systems found to be negative.

















PHYSICAL EXAMINATION:


VITAL SIGNS: Please see below.


GENERAL: Pleasant and cooperative. No acute distress. 


HEENT: PERRL. Extraocular movements intact. Clear conjunctiva, no facial droop


CARDIOVASCULAR: Regular rate and rhythm. No murmurs, rubs, or gallops


LUNGS: +scattered rhonchi


ABDOMEN: Soft, nontender, nondistended. Positive bowel sounds. Normal active 

bowel sounds


NEUROLOGICAL: Alert and oriented times three. Cranial nerves II through XII 

grossly intact. Sensation grossly intact in all 4 limbs


EXTREMITIES: 5\5 strength left UE, 4+ RUE with mild pronator drift, 4+\5 

strength right lower extremity. 5/5 strength in left lower extremity. 





SKIN: large sacral and posterior thigh hyperpigmentation, scar tissue vs large 

DTI?, intertriginous erythema, bilat calves with venous stasis changes and 

hyperkeratotic 

















ASSESSMENT:69-year-old M with past medical history of Afib and chronic RLE DVT 

who presents status post  hemorrhagic stroke





PLAN:


1. Rehab- PT/OT advnace gait and ADLs, strengthen/stretch/maintain ROM all 4 

limbs


SLP- cog eval, patient was tolerating regular diet, however will downgrade to 

level 3 until SLP evaluates him here


2. Neuro: s/p left posterior frontal intraparenchymal hemorrhage with right 

sided paresis, Warfarin on hold to be cleared by neurosurgery


-possible focal motor seizure activity 5/26/20? patient reporting his abdomen 

and RLE shook for 5 minutes and that this had started about one month prior to 

his stroke on 5/6 occasions, CTH showing no acute bleed,  discussed case with 

neurology and started oral Keppra 750mg BID, patient to f/u outpatient w/ neuro-

no further episodes of shaking


-there was concern on CT imaging both at [facility] and [facility] for possible underlying 

brain mass, MRI with and without contrast performed at [facility] which was negative, 

this may need to be repeated per neuro outpatient recs


-c/u ASA, statin for secondary stroke prevention and optimize BP control


-c/u prozac for motor recovery


2. CArdiac: hx of Afib- coumadin on hold, c/u betablocker


-recent ECHO 5-18-20 showing, "left ventricle size is normal. Left ventricular 

wall thickness is 


normal. Overall LV systolic function is in the low normal range. The calculated 

left ventricular ejection fraction is 51.4% . Indeterminate LV diastolic 

function." will monitor for fluid overload, medicine consulted to assist in 

overall management


-HLD c/u Zocor


-HTn c/u lisinoprol


3. Endo: A1c 5.4% on 5-17-20 will hold off on continuing metformin and monitor 

FS


4. Resp: current smoker, c/u NicoDerm patch, Duonebs, guaifenesin, and incentive

spirometry, monitor for infection


6. Skin: patient with large sacral/thigh ecchymosis and scar tissue, unclear if 

deep tissue injury as per [facility] notes appears chronic- will c/u barrier cream, 

turn q2 in bed, and avoid elevation of head of bed


-heel floats/optifoam to bilat heels


-venous stasis changes to bilat LE- c/u vanicream


-intertriginous rash c/u nystatin powder 


7. Pain: tylenol and oxycodone (will increase to q4h prn)


8. DVT ppx: patient with known chronic right sided DVT, recent Doppler 5-19-20, 

"Chronic non occlusive deep venous thrombosis of proximal


to distal right popliteal vein." c/u heparin 


9. GI ppx; protonix


10. : monitor PVRs


11. Dispo:possibly to home 5/29/20 as patient nearing his baseline


Allergies


Coded Allergies:  


     Sulfa (Sulfonamide Antibiotics) (Verified  Allergy, Intermediate, rash, 

5/17/20)





Vital Signs





Vital Signs








  Date Time  Temp Pulse Resp B/P (MAP) Pulse Ox O2 Delivery O2 Flow Rate FiO2


 


5/28/20 10:04   19   Room Air  


 


5/28/20 09:59  73  116/69    


 


5/28/20 06:00 96.8    96   











Laboratory Data


Labs 24H


Laboratory Tests 2


5/27/20 11:45: Bedside Glucose (Misc Panel) 92


5/27/20 16:50: Bedside Glucose (Misc Panel) 93


5/28/20 05:54: Bedside Glucose (Misc Panel) 103





Current Medications


Current Medications





Current Medications








 Medications


  (Trade)  Dose


 Ordered  Sig/Ministerio


 Route


 PRN Reason  Start Time


 Stop Time Status Last Admin


Dose Admin


 


 Acetaminophen


  (Tylenol Tab)  650 mg  Q4HP  PRN


 PO


 fever/MILD PAIN (PS 1-4)  5/20/20 12:30


    5/22/20 13:08





 


 Albuterol/


 Ipratropium


  (Duoneb (Ipr


 0.5mg/Alb 2.5mg))  3 ml  RTID


 NEB


   5/20/20 14:00


    5/28/20 07:19





 


 Aspirin


  (Ecotrin)  81 mg  DAILY


 PO


   5/21/20 09:00


    5/28/20 09:58





 


 Carvedilol


  (COReg)  12.5 mg  BID


 PO


   5/20/20 21:00


    5/28/20 09:59





 


 Dextrose


  (Dextrose 50%)  25 ml  ASDIRECTED  PRN


 IV


 SEE LABEL COMMENTS  5/20/20 12:30


     





 


 Docusate Sodium


  (Colace)  100 mg  BID


 PO


   5/20/20 21:00


    5/27/20 09:50





 


 Emollient Cream


  (Vanicream)  1 dose  BID


 TOP


   5/20/20 21:00


    5/28/20 10:01





 


 Fluoxetine HCl


  (PROzac)  40 mg  DAILY


 PO


   5/21/20 09:00


    5/28/20 09:59





 


 Glucagon


  (Glucagon)  1 mg  ASDIRECTED  PRN


 SC


 SEE LABEL COMMENTS  5/20/20 12:30


     





 


 Glucose


  (Glucose)  16 GM  ASDIRECTED  PRN


 PO


 SEE LABEL COMMENTS  5/20/20 12:30


     





 


 Guaifenesin


  (Robitussin Tab)  400 mg  TID


 PO


   5/20/20 16:00


    5/28/20 09:58





 


 Heparin Sodium


  (Porcine)


  (Heparin)  5,000 units  Q12H


 SC


   5/20/20 21:00


    5/28/20 09:59





 


 Home Med


  (Med Rec


 Complete!)    ASDIRECTED


 XX


   5/20/20 13:30


 5/20/20 13:31 DC  





 


 Insulin Human


 Lispro


  (HumaLOG INSULIN)  SEE


 PROTOCOL


 TABLE  AC


 SC


   5/20/20 12:00


 5/20/20 13:22 DC  





 


 Insulin Human


 Lispro


  (HumaLOG INSULIN)  SEE


 PROTOCOL


 TABLE  QHS


 SC


   5/20/20 21:00


 5/20/20 13:22 DC  





 


 Levetiracetam


  (Keppra)  750 mg  BID


 PO


   5/27/20 21:00


    5/28/20 09:58





 


 Lisinopril


  (Prinivil)  2.5 mg  DAILY


 PO


   5/21/20 09:00


    5/28/20 09:59





 


 Magnesium


 Hydroxide


  (Milk Of


 Magnesia)  30 ml  DAILYPRN  PRN


 PO


 CONSTIPATION  5/20/20 12:30


     





 


 Nicotine


  (Nicoderm Cq


 14mg)  1 patch  DAILY


 TD


   5/21/20 09:00


    5/28/20 10:00





 


 Nystatin


  (Mycostatin


 Powder, Nystop)  apply to


 abdominal


 fo...  BID


 TOP


   5/20/20 21:00


    5/28/20 10:00





 


 Oxycodone HCl


  (Roxicodone,


 Oxyir)  5 mg  Q4H  PRN


 PO


 PAIN  5/21/20 12:45


    5/28/20 10:04





 


 Oxycodone HCl


  (Roxicodone,


 Oxyir)  5 mg  Q6HP  PRN


 PO


 PAIN  5/20/20 12:30


 5/21/20 12:31 DC 5/21/20 11:21





 


 Pantoprazole


 Sodium


  (Protonix)  40 mg  DAILY


 PO


   5/21/20 09:00


    5/28/20 09:59





 


 Senna


  (Senokot)  1 tab  QHS


 PO


   5/20/20 21:00


    5/26/20 20:56





 


 Simvastatin


  (Zocor)  20 mg  QHS


 PO


   5/20/20 21:00


    5/27/20 21:25




















STEPHANE FERNANDO MD         May 28, 2020 11:40

## 2020-05-29 VITALS — DIASTOLIC BLOOD PRESSURE: 23 MMHG | SYSTOLIC BLOOD PRESSURE: 122 MMHG

## 2020-05-29 VITALS — DIASTOLIC BLOOD PRESSURE: 84 MMHG | SYSTOLIC BLOOD PRESSURE: 141 MMHG

## 2020-05-29 VITALS — SYSTOLIC BLOOD PRESSURE: 141 MMHG | DIASTOLIC BLOOD PRESSURE: 84 MMHG

## 2020-05-29 LAB
BASOPHILS # BLD AUTO: 0.1 10^3/UL (ref 0–0.2)
BASOPHILS NFR BLD AUTO: 1.5 % (ref 0–1)
BUN SERPL-MCNC: 15 MG/DL (ref 7–18)
CALCIUM SERPL-MCNC: 8.6 MG/DL (ref 8.8–10.2)
CHLORIDE SERPL-SCNC: 108 MEQ/L (ref 98–107)
CO2 SERPL-SCNC: 28 MEQ/L (ref 21–32)
CREAT SERPL-MCNC: 0.94 MG/DL (ref 0.7–1.3)
EOSINOPHIL # BLD AUTO: 0.2 10^3/UL (ref 0–0.5)
EOSINOPHIL NFR BLD AUTO: 3.4 % (ref 0–3)
GFR SERPL CREATININE-BSD FRML MDRD: > 60 ML/MIN/{1.73_M2} (ref 42–?)
GLUCOSE SERPL-MCNC: 95 MG/DL (ref 70–100)
HCT VFR BLD AUTO: 38.3 % (ref 42–52)
HGB BLD-MCNC: 10.9 G/DL (ref 13.5–17.5)
LYMPHOCYTES # BLD AUTO: 1.3 10^3/UL (ref 1.5–5)
LYMPHOCYTES NFR BLD AUTO: 21.9 % (ref 24–44)
MCH RBC QN AUTO: 24.3 PG (ref 27–33)
MCHC RBC AUTO-ENTMCNC: 28.5 G/DL (ref 32–36.5)
MCV RBC AUTO: 85.5 FL (ref 80–96)
MONOCYTES # BLD AUTO: 0.9 10^3/UL (ref 0–0.8)
MONOCYTES NFR BLD AUTO: 14.5 % (ref 0–5)
NEUTROPHILS # BLD AUTO: 3.6 10^3/UL (ref 1.5–8.5)
NEUTROPHILS NFR BLD AUTO: 58.4 % (ref 36–66)
PLATELET # BLD AUTO: 239 10^3/UL (ref 150–450)
POTASSIUM SERPL-SCNC: 4.5 MEQ/L (ref 3.5–5.1)
RBC # BLD AUTO: 4.48 10^6/UL (ref 4.3–6.1)
SODIUM SERPL-SCNC: 143 MEQ/L (ref 136–145)
WBC # BLD AUTO: 6.1 10^3/UL (ref 4–10)

## 2020-05-29 RX ADMIN — WHITE PETROLATUM SCH DOSE: 57; 17 PASTE TOPICAL at 11:49

## 2020-05-29 RX ADMIN — NYSTATIN SCH DOSE: 100000 POWDER TOPICAL at 08:28

## 2020-05-29 RX ADMIN — SODIUM CHLORIDE SCH UNITS: 4.5 INJECTION, SOLUTION INTRAVENOUS at 08:27

## 2020-05-29 RX ADMIN — PANTOPRAZOLE SODIUM SCH MG: 40 TABLET, DELAYED RELEASE ORAL at 08:27

## 2020-05-29 RX ADMIN — LEVETIRACETAM SCH MG: 250 TABLET, FILM COATED ORAL at 00:11

## 2020-05-29 RX ADMIN — NICOTINE SCH PATCH: 14 PATCH, EXTENDED RELEASE TRANSDERMAL at 08:27

## 2020-05-29 RX ADMIN — WHITE PETROLATUM SCH DOSE: 57; 17 PASTE TOPICAL at 06:00

## 2020-05-29 RX ADMIN — IPRATROPIUM BROMIDE AND ALBUTEROL SULFATE SCH ML: .5; 3 SOLUTION RESPIRATORY (INHALATION) at 14:01

## 2020-05-29 RX ADMIN — LEVETIRACETAM SCH MG: 250 TABLET, FILM COATED ORAL at 08:26

## 2020-05-29 RX ADMIN — ASPIRIN SCH MG: 81 TABLET ORAL at 08:26

## 2020-05-29 RX ADMIN — DOCUSATE SODIUM SCH MG: 100 CAPSULE, LIQUID FILLED ORAL at 08:27

## 2020-05-29 RX ADMIN — IPRATROPIUM BROMIDE AND ALBUTEROL SULFATE SCH ML: .5; 3 SOLUTION RESPIRATORY (INHALATION) at 07:05

## 2020-05-29 RX ADMIN — Medication SCH DOSE: at 08:28

## 2020-05-29 RX ADMIN — LISINOPRIL SCH MG: 2.5 TABLET ORAL at 08:26

## 2020-06-06 NOTE — PMRDS
DATE OF ADMISSION:  05/20/2020

DATE OF DISCHARGE:  05/29/2020

 

 

CHIEF COMPLAINT/DISCHARGE DIAGNOSIS:  Intracranial hemorrhage.

 

HISTORY OF PRESENT ILLNESS:  Please insert the history of present illness from

the history and physical.

 

PAST MEDICAL HISTORY:  As per history of present illness.

 

HOSPITAL COURSE:  The patient was admitted and enrolled in a comprehensive

physical therapy (PT), occupational therapy (OT), speech language pathology

program. He received 24-hour nursing supervision and weekly team meetings were

held to discuss his progress.  The patient presented with large sacral thigh

ecchymosis and scar tissue and was provided with daily skin treatment.  He also

had venous stasis changes in his bilateral lower extremities.  The patient was

provided with a referral for wound care followup.  The patient was maintained on

prophylactic heparin in the setting of chronic deep vein thrombosis (DVT) and was

instructed to follow up with neurosurgery as when to restart his Coumadin for his

atrial fibrillation and DVT.  The patient reported a one-time episode of

abdominal and right lower extremity shaking and repeat CT head did not show any

new bleed, the case was discussed with neurology who recommended starting oral

Keppra motor seizures with outpatient followup.  The patient made gradual but

consisting gains in therapy and was deemed medically and functionally stable to

return home.

 

DISCHARGE MEDICATIONS:  As per instructions.

 

FUNCTIONAL HISTORY OF DISCHARGE:  The patient was contact guard for function and

transfers and able to propel his wheelchair 100 feet at a modified independent

level.

 

Thank you for this referral.

## 2020-11-09 ENCOUNTER — HOSPITAL ENCOUNTER (INPATIENT)
Dept: HOSPITAL 53 - M ED | Age: 70
LOS: 2 days | DRG: 579 | End: 2020-11-11
Attending: INTERNAL MEDICINE | Admitting: INTERNAL MEDICINE
Payer: MEDICARE

## 2020-11-09 VITALS — WEIGHT: 197.98 LBS | HEIGHT: 75 IN | BODY MASS INDEX: 24.62 KG/M2

## 2020-11-09 VITALS — DIASTOLIC BLOOD PRESSURE: 69 MMHG | SYSTOLIC BLOOD PRESSURE: 104 MMHG

## 2020-11-09 DIAGNOSIS — Z79.01: ICD-10-CM

## 2020-11-09 DIAGNOSIS — Z87.891: ICD-10-CM

## 2020-11-09 DIAGNOSIS — R62.7: ICD-10-CM

## 2020-11-09 DIAGNOSIS — R65.21: ICD-10-CM

## 2020-11-09 DIAGNOSIS — L89.154: Primary | ICD-10-CM

## 2020-11-09 DIAGNOSIS — F41.9: ICD-10-CM

## 2020-11-09 DIAGNOSIS — Z79.899: ICD-10-CM

## 2020-11-09 DIAGNOSIS — E78.5: ICD-10-CM

## 2020-11-09 DIAGNOSIS — F32.9: ICD-10-CM

## 2020-11-09 DIAGNOSIS — E11.43: ICD-10-CM

## 2020-11-09 DIAGNOSIS — R09.2: ICD-10-CM

## 2020-11-09 DIAGNOSIS — A41.9: ICD-10-CM

## 2020-11-09 DIAGNOSIS — Z88.2: ICD-10-CM

## 2020-11-09 DIAGNOSIS — I95.9: ICD-10-CM

## 2020-11-09 DIAGNOSIS — Z66: ICD-10-CM

## 2020-11-09 DIAGNOSIS — K21.9: ICD-10-CM

## 2020-11-09 DIAGNOSIS — E43: ICD-10-CM

## 2020-11-09 DIAGNOSIS — Z74.01: ICD-10-CM

## 2020-11-09 DIAGNOSIS — M86.9: ICD-10-CM

## 2020-11-09 DIAGNOSIS — Z86.718: ICD-10-CM

## 2020-11-09 DIAGNOSIS — K31.84: ICD-10-CM

## 2020-11-09 DIAGNOSIS — R00.0: ICD-10-CM

## 2020-11-09 DIAGNOSIS — I10: ICD-10-CM

## 2020-11-09 DIAGNOSIS — I69.351: ICD-10-CM

## 2020-11-09 DIAGNOSIS — G93.40: ICD-10-CM

## 2020-11-09 DIAGNOSIS — E11.51: ICD-10-CM

## 2020-11-09 DIAGNOSIS — E86.0: ICD-10-CM

## 2020-11-09 DIAGNOSIS — I48.20: ICD-10-CM

## 2020-11-09 DIAGNOSIS — Z51.5: ICD-10-CM

## 2020-11-09 LAB
ALBUMIN SERPL BCG-MCNC: 1.9 GM/DL (ref 3.2–5.2)
ALT SERPL W P-5'-P-CCNC: 7 U/L (ref 12–78)
BASE EXCESS BLDV CALC-SCNC: 0.6 MMOL/L (ref -2–2)
BASOPHILS # BLD AUTO: 0 10^3/UL (ref 0–0.2)
BASOPHILS NFR BLD AUTO: 0.1 % (ref 0–1)
BILIRUB CONJ SERPL-MCNC: 0.2 MG/DL (ref 0–0.2)
BILIRUB SERPL-MCNC: 0.4 MG/DL (ref 0.2–1)
CK SERPL-CCNC: 62 U/L (ref 39–308)
CO2 BLDV CALC-SCNC: 29.5 MEQ/L (ref 24–28)
EOSINOPHIL # BLD AUTO: 0.1 10^3/UL (ref 0–0.5)
EOSINOPHIL NFR BLD AUTO: 0.7 % (ref 0–3)
HCO3 BLDV-SCNC: 27.8 MEQ/L (ref 23–27)
HCO3 STD BLDV-SCNC: 24.4 MEQ/L
HCT VFR BLD AUTO: 38.8 % (ref 42–52)
HGB BLD-MCNC: 11.2 G/DL (ref 13.5–17.5)
LYMPHOCYTES # BLD AUTO: 0.9 10^3/UL (ref 1.5–5)
LYMPHOCYTES NFR BLD AUTO: 7.7 % (ref 24–44)
MCH RBC QN AUTO: 27.2 PG (ref 27–33)
MCHC RBC AUTO-ENTMCNC: 28.9 G/DL (ref 32–36.5)
MCV RBC AUTO: 94.2 FL (ref 80–96)
MONOCYTES # BLD AUTO: 0.8 10^3/UL (ref 0–0.8)
MONOCYTES NFR BLD AUTO: 7.5 % (ref 0–5)
NEUTROPHILS # BLD AUTO: 9.3 10^3/UL (ref 1.5–8.5)
NEUTROPHILS NFR BLD AUTO: 83.4 % (ref 36–66)
OSMOLALITY SERPL: 310 MOSM/KG (ref 280–301)
PCO2 BLDV: 56 MMHG (ref 38–50)
PH BLDV: 7.31 UNITS (ref 7.33–7.43)
PLATELET # BLD AUTO: 264 10^3/UL (ref 150–450)
PO2 BLDV: 38.4 MMHG (ref 30–50)
PROT SERPL-MCNC: 6.9 GM/DL (ref 6.4–8.2)
RBC # BLD AUTO: 4.12 10^6/UL (ref 4.3–6.1)
SAO2 % BLDV: 67.8 % (ref 60–80)
TSH SERPL DL<=0.005 MIU/L-ACNC: 0.72 UIU/ML (ref 0.36–3.74)
WBC # BLD AUTO: 11.2 10^3/UL (ref 4–10)

## 2020-11-09 RX ADMIN — SODIUM CHLORIDE SCH MLS/HR: 9 INJECTION, SOLUTION INTRAVENOUS at 19:26

## 2020-11-09 RX ADMIN — INSULIN LISPRO SCH UNITS: 100 INJECTION, SOLUTION INTRAVENOUS; SUBCUTANEOUS at 23:30

## 2020-11-09 RX ADMIN — CEFEPIME HYDROCHLORIDE SCH MLS/HR: 1 INJECTION, POWDER, FOR SOLUTION INTRAMUSCULAR; INTRAVENOUS at 23:29

## 2020-11-09 RX ADMIN — SIMVASTATIN SCH MG: 20 TABLET, FILM COATED ORAL at 23:29

## 2020-11-09 RX ADMIN — LEVETIRACETAM SCH MG: 250 TABLET, FILM COATED ORAL at 23:26

## 2020-11-09 NOTE — REP
INDICATION:

Altered Mental Status.



COMPARISON:

Comparison study May 17, 2020..



TECHNIQUE:

Sitting AP radiograph.



FINDINGS:

Left hemidiaphragm is somewhat elevated.  There is linear fibrosis above the left

hemidiaphragm unchanged.  The heart is enlarged.  This is unchanged.  Aorta is

somewhat tortuous.  Pulmonary vasculature is not increased.  No new infiltrate is seen.



IMPRESSION:

Cardiomegaly.  Elevated left hemidiaphragm with linear fibrosis in the left base.  No

new infiltrate seen.





<Electronically signed by Tej Birmingham > 11/09/20 [id number]

## 2020-11-09 NOTE — HPEPDOC
[facility] Medical History & Physical


Date of Admission


2020


Date of Service:  2020


Attending Physician:  Gracie Espinoza MD





History and Physical


CHIEF COMPLAINT: Weakness





HISTORY OF PRESENT ILLNESS: 


Patient is a 69-year-old male with past medical history of atrial fibrillation, 

CVA secondary to intracranial bleed 2020, hypertension, 

anxiety/depression, hyperlipidemia, diabetes mellitus type 2, history of DVT of 

the right leg, chronic back and leg pain, GERD, decubitus sacral ulcer, PAD/PVD 

who presented to [facility] emergency room after being brought in by his wife for

increased weakness, decreased appetite. According to the patient he had a stroke

secondary to intracranial bleed in May of this year. He did appear to time in 

rehabilitation and was later discharged home where he lives with his wife. He 

admits to increased fullness with very minimal amount of food, increased 

depression, crease to hopelessness, tearfulness, decreased appetite, increased 

weakness. He was recently started with physical therapy last week but he became 

frustrated and decided not to go back. He says that he feels like "I'm dying.". 

He says after he eats one bite of Cheerios he feels full. He denies nausea, 

vomiting, abdominal pain, lightheadedness, dizziness, blurry vision chest pain, 

shortness of breath, fevers, chills, palpitations, recent illnesses, medication 

changes, recent falls. He states to follow with his primary care doctor 

regularly.





In the emergency room blood pressure was found to be low at 88/60, other vital 

signs were within normal limits. The patient was given a 500 mL bolus and 

started on continuous fluids and blood pressure improved. CXR: On examination he

had a very large sacral stage IV decubitus ulcer which was very foul-smelling, 

purulent, multiple areas of necrotic tissue and some that are petrified. States 

to have pain in this area often. He often has episodes of incontinence of bowel 

and bladder which has been complicating keeping this area clean at home. He did 

not follow with a wound care specialist at home and has no home health aides. 

CBC mildly elevated at 11.2, H&H 11.2/30.8 troponin negative, patient was mildly

acidotic at 7.313 on VBG. Albumin 1.9, lactic acid 1.5. Chest x-ray was 

negative. The patient was admitted for hypotension likely secondary to 

dehydration and decreased by mouth intake, ? failure to thrive, stage IV 

decubitus ulcer requiring debridement and treatment.





REVIEW OF SYSTEMS: Neg except mentioned above 





PAST MEDICAL HISTORY: 


1. atrial fibrillation on xarelto


2. CVA 2/2 to ICB 2020


3. HTN


4. HLD


5. Anxiety/depression


6. DM type II 


7. Hx of DVT right leg 


8. Chronic back and leg pain


9. GERD


10. Decubitus sacral ulcer 


11. Chronic physical deconditioning 


12. PVD 


13. Malnutrition





PAST SURGICAL HISTORY: 


1. perianal abscess surgeries


2. fat removal from breast


3. tonsillectomy





FAMILY HISTORY: 


Father: CAD.  at 73 y/o 


Mother: DM type II.  at 95 y/o 





SOCIAL HISTORY: 


Prior smoker for 55 years, 3 PPD. Quit 2020. Denies alcohol or drug use. 

Follows with PCP: dr. jose r corrales, cardiology and neurology. Lives with his 

wife and he is mostly bedbound. Needs assistance to get up, with all AODL. No 

home health aides. 





ALLERGIES: 


Please see below. 





HOME MEDICATIONS: 


Please see below.





PHYSICAL EXAMINATION: 


VS: Please see below 


CONSTITUTIONAL: No acute distress, resting comfortably, AAO x 3 but slow to 

respond at times. 


EYES: PERRLA, EOM intact


HENT, MOUTH: Normocephalic, atraumatic, dry mucous membranes


NECK: SUPPLE, no JVD, no lymphadenopathy, no carotid bruit


CV: Regular rate and rhythm, S1S2 normal, no murmurs/rubs/gallops


RESPIRATORY: Clear to auscultation bilaterally, no rales/rhonchi/wheezes


GI:  BS positive in 4 quadrants, soft, nontender, nondistended, no rebound or 

guarding, no organomegaly


: Deferred


MUSCULOSKELETAL: Decreased ROM in all ext. No cyanosis, clubbing, swelling, 

joint deformity, extremity edema


INTEGUMENTARY: Dry skin all over body. Dark skin discoloration in b/l lower ext-

PAD/PVD. Large Stage 4 decubitus ulcer with many necrotic areas, purulence, foul

smell-tender to touch. 


NEUROLOGIC: Strength 4/5 in right lower. 5/5 strength in right upper ext, LLE 

and LUE. Cranial Nerves II-XII are intact, no new focal deficits. Sensation in 

tact to all ext. Reflexes in all ext +


PSYCHIATRIC: Mood and affect are normal 





LABORATORY DATA: 


Please see below 





IMAGING: 


CXR: 


Cardiomegaly.  Elevated left hemidiaphragm with linear fibrosis in the left 

base.  No new infiltrate seen.





ASSESSMENT: 69-year-old male with past medical history of atrial fibrillation, 

CVA secondary to intracranial bleed 2020, hypertension, 

anxiety/depression, hyperlipidemia, diabetes mellitus type 2, history of DVT of 

the right leg, chronic back and leg pain, GERD, decubitus sacral ulcer admitted 

under inpatient status for hypotension likely 2/2 to dehydration and decreased 

by mouth intake, ? failure to thrive, stage IV decubitus ulcer requiring debride

ment and treatment.





PLAN:  


#Hypotension likely 2/2 to dehydration, decreased PO intake 


-Hx of HTN on carvedilol, lisinopril at home 


-BP 88/60 but after 500 cc bolus and continuous fluids --> 102/58 with MAP >65. 


-Not suspecting 2/2 to sepsis or infection


-C/w IVFs at 100 cc/hr, holding all antihypertensive medications. Encouraging 

fluids Q2hrs while awake. 


-Monitor on tele





#Failure to thrive 


-Malnutrition, incr weakness, wt loss, decreased appetite, incr depression 


-Poor appetite 2/2 to early satiety


-Full nutritional assessment ordered, address depression and consider appetite 

stimulant. 





#Early satiety 2/2 to unknown cause


-R/o cancer, gastroparesis with history of DM type II


-He denies abd pain, n/v/d but admits to aversion of some foods, not having 

appetite. 


-He denies difficulty swallowing 


-Will order CT abd, consider starting reglan and GI consult. 





#Stage 4 sacral decubitus ulcer


-WBC 11K, afebrile. 


-Poor nutritional status, Hx of PAD/PVD 


-Very foul smell, purulent, extensive wound with necrotic tissue 


-Advanced wound care consult placed, please call 20 AM


-F/u wound cx, blood cultures, CT abd/pelvis to see if osteo present 


-Started on cefepime, vancomycin


-Surgery consulted for debridement





#Protein calorie malnutrition  


-Albumin low at 1.9, poor appetite


-Full nutritional assessment, f/u recommendations 





#Atrial fibrillation 


-rate controlled. 


-Hold BB, c/w xarelto





#Hx of intracranial bleed, CVA with right side weakness 


-No increased weakness from baseline. No new deficits


-C/w statin, on xarelto 


-PT/OT 





#Anxiety/depression 


-Worsened over the past several months


-Currently on buspirone and fluoxetine but these do not seem to help 


-Consider psych consult to help with management of meds 





#Hx of DVT right leg 


-No increased swelling of legs 


-C/w xarelto 





#Chronic back pain, leg pain 


-C/w home medications





#DM type II 


-Consistent carb diet 


-F/u lipid panel, HbA1c





#HLD


-C/w statin 





#? seizure d/o 


-C/w keppra 





#GERD 


-PPI 





#DVT px 


- xarelto 





DISPOSITION: Admitted under inpatient status. Surgery aware of consult, please 

call in Dr. Stillerman consult in the AM. Will likely need rehab at discharge. 

Wife can be contacted at [phone].





Vital Signs





Vital Signs








  Date Time  Temp Pulse Resp B/P (MAP) Pulse Ox O2 Delivery O2 Flow Rate FiO2


 


20 17:24  83 18 102/58 (73) 96 Room Air  


 


20 15:02 98.2       











Laboratory Data


Labs 24H


Laboratory Tests 2


20 15:50: 


POC Glucose (Misc Panel) 106H, POC Sodium (Misc Panel) 145, POC Potassium (Misc 

Panel) 3.9, POC Chloride (Misc Panel) 109, POC Total CO2 (Misc Panel) 29.0H, POC

Blood Urea Nitrogen (Misc Panel 45H, POC Ionized Calcium (Misc Panel) 4.7, POC 

Creatinine (Misc Panel) 1.0, POC Hematocrit (Misc Panel) 36.0L


20 15:52: 


Immature Granulocyte % (Auto) 0.6, Neutrophils (%) (Auto) 83.4H, Lymphocytes (%)

(Auto) 7.7L, Monocytes (%) (Auto) 7.5H, Eosinophils (%) (Auto) 0.7, Basophils 

(%) (Auto) 0.1, Neutrophils # (Auto) 9.3H, Lymphocytes # (Auto) 0.9L, Monocytes 

# (Auto) 0.8, Eosinophils # (Auto) 0.1, Basophils # (Auto) 0.0, Nucleated Red 

Blood Cells % (auto) 0.0, POC Troponin I (Misc) 0.00, Blood Gas Bicarbonate 

Standard 24.4, Venous Blood pH 7.313L, Venous Blood Partial Pressure CO2 56.0H, 

Venous Blood Partial Pressure O2 38.4, Venous Blood Total Carbon Dioxide 29.5H, 

Venous Blood HCO3 27.8H, Venous Blood Oxygen Saturation 67.8, Venous Blood Base 

Excess 0.6, Osmolality 310H, Lactic Acid Level 1.5, Total Bilirubin 0.4, Direct 

Bilirubin 0.2, Aspartate Amino Transf (AST/SGOT) 15, Alanine Aminotransferase 

(ALT/SGPT) 7L, Alkaline Phosphatase 146H, Total Creatine Kinase 62, Total 

Protein 6.9, Albumin 1.9L, Albumin/Globulin Ratio 0.4, Thyroid Stimulating 

Hormone (TSH) 0.724


CBC/BMP


Laboratory Tests


20 15:52











Home Medications


Scheduled


Buspirone HCl (Buspirone HCl) 10 Mg Tablet, 1 TAB PO BID


Carvedilol (Carvedilol) 12.5 Mg Tablet, 12.5 MG PO BID


   STARTED AT [facility] 


Carvedilol (Carvedilol) 12.5 Mg Tablet, 12.5 MG PO BID


Docusate Sodium (Colace) 100 Mg Capsule, 100 MG PO DAILY


Fluoxetine HCl (Prozac) 40 Mg Capsule, 40 MG PO DAILY


Levetiracetam (Keppra) 250 Mg Tablet, 750 MG PO BID


Lisinopril (Lisinopril) 2.5 Mg Tablet, 2.5 MG PO DAILY


Metformin HCl (Metformin HCl) 500 Mg Tablet, 1 TAB PO DAILY


Nicotine (Nicotine Patch) 14 Mg/24 Hr Patch.td24, 14 MG TOP DAILY


   STARTED AT [facility] 


Nicotine (Nicotine Patch) 14 Mg Patch.td24, 1 PATCH TD DAILY


Pantoprazole Sodium (Pantoprazole Sodium) 40 Mg Tablet.dr, 40 MG PO DAILY


Rivaroxaban (Xarelto) 10 Mg Tablet, 10 MG PO DAILY


Rivaroxaban (Xarelto) 15 Mg Tablet, 2.5 MG PO BID


Simvastatin (Simvastatin) 20 Mg Tablet, 20 MG PO QHS





Scheduled PRN


Acetaminophen (Tylenol) 325 Mg Tablet, 650 MG PO Q6H PRN for PAIN


Hydrocodone/Acetaminophen (Hydrocodone-Acetamin  mg) 1 Each Tablet, 1 TAB 

PO QIDP PRN for pain





Allergies


Coded Allergies:  


     Sulfa (Sulfonamide Antibiotics) (Verified  Allergy, Intermediate, rash, )





A-FIB/CHADSVASC


A-FIB History


Current/History of A-Fib/PAF?:  Yes


Current PO Anticoag Therapy:  Yes





Age/Risk Factor Scoring


CHADSVASC:  








CHADSVASC Response (Comments) Value


 


Age Risk Factor Age 65-74 years old 1


 


Gender Risk Factor Male 0


 


Hx of CHF No 0


 


Hx of HTN Yes 1


 


Hx of Stroke/TIA/or VTE Yes 2


 


Hx of Diabetes Yes 1


 


Hx of Vascular Disease Yes 1


 


Total  6











Treatment


Treatment ordered:  Other


Other anticoagulant ordered:  Gracie Zavala MD             2020 18:54

## 2020-11-09 NOTE — REPVR
PROCEDURE INFORMATION: 

Exam: CT Abdomen And Pelvis With Contrast 

Exam date and time: 11/9/2020 8:52 PM 

Age: 69 years old 

Clinical indication: Other: Early satiety, stage 4 decubitus, poss osteo? 



TECHNIQUE: 

Imaging protocol: Computed tomography of the abdomen and pelvis with 

intravenous contrast. 

Radiation optimization: All CT scans at this facility use at least one of these 

dose optimization techniques: automated exposure control; mA and/or kV 

adjustment per patient size (includes targeted exams where dose is matched to 

clinical indication); or iterative reconstruction. 

Contrast material: ISOVUE 370; Contrast volume: 100 ml; Contrast route: 

INTRAVENOUS (IV);  



COMPARISON: 

No relevant prior studies available. 



FINDINGS: 

Pleural space: Small dependent right pleural effusion a a dependent right 

basilar atelectasis is present. 



Liver: Liver appears normal with no focal abnormality. 

Gallbladder and bile ducts: Gallstones are present in the gallbladder lumen. No 

adjacent fluid or duct dilatation. 

Pancreas: Pancreas appears normal. No focal mass or peripancreatic 

inflammation. 

Spleen: Spleen appears homogeneous without focal mass. 

Adrenal glands: Adrenal glands are normal in appearance. 

Kidneys and ureters: Kidneys demonstrate simple fluid density cysts measuring 

up to 3 cm. No concerning renal lesion. No obstructive change. 

Stomach and bowel: No evidence of small bowel obstruction. No evidence of acute 

diverticulitis. 

Appendix: No evidence of appendicitis. 



Intraperitoneal space: No pneumoperitoneum. 

Vasculature: Atherosclerotic change present in the aorta, without aneurysm. 

Main portal and splenic veins enhance normally. 

Lymph nodes: No enlarged lymph nodes. 

Urinary bladder: Urinary bladder appears normal. 

Bones/joints: Degenerative changes are present in the hips, right worse than 

left, and in the lumbosacral spine. The the the soft tissue ulceration 

superficial to the distal sacrum and coccyx is present extending down to bone. 

There does appear to be intra osseous air suggesting osteomyelitis of the 

coccyx 

Soft tissues: No concerning focal abnormality of the extra-abdominal and pelvic 

soft tissues. 



IMPRESSION: 

1. Deep midline decubitus ulcer over the coccyx extending down to bone, with 

air present within the coccyx indicative of osteomyelitis. No abscess. 

2. Small right pleural effusion and adjacent atelectasis 



COMMENTS: 

Consistent with the American College of Radiology's Incidental Findings 

Committee white paper (J Am Jameson Radiol 2018): Any incidental renal lesion less 

than 1 cm or classified as too small to characterize, or any incidental cystic 

renal lesion characterized as simple-appearing, is likely benign. No follow-up 

imaging is recommended for these lesions per consensus recommendations based on 

imaging criteria. 



Electronically signed by: Yosvany Ramos On 11/09/2020  21:27:37 PM

## 2020-11-10 VITALS — SYSTOLIC BLOOD PRESSURE: 112 MMHG | DIASTOLIC BLOOD PRESSURE: 68 MMHG

## 2020-11-10 VITALS — SYSTOLIC BLOOD PRESSURE: 108 MMHG | DIASTOLIC BLOOD PRESSURE: 67 MMHG

## 2020-11-10 VITALS — SYSTOLIC BLOOD PRESSURE: 118 MMHG | DIASTOLIC BLOOD PRESSURE: 68 MMHG

## 2020-11-10 VITALS — DIASTOLIC BLOOD PRESSURE: 64 MMHG | SYSTOLIC BLOOD PRESSURE: 103 MMHG

## 2020-11-10 VITALS — SYSTOLIC BLOOD PRESSURE: 104 MMHG | DIASTOLIC BLOOD PRESSURE: 74 MMHG

## 2020-11-10 LAB
ALBUMIN SERPL BCG-MCNC: 1.7 GM/DL (ref 3.2–5.2)
ALT SERPL W P-5'-P-CCNC: < 6 U/L (ref 12–78)
BILIRUB SERPL-MCNC: 0.4 MG/DL (ref 0.2–1)
BUN SERPL-MCNC: 28 MG/DL (ref 7–18)
C DIFF TOX GENS STL QL NAA+PROBE: NEGATIVE
CALCIUM SERPL-MCNC: 8.4 MG/DL (ref 8.8–10.2)
CHLORIDE SERPL-SCNC: 112 MEQ/L (ref 98–107)
CHOLEST SERPL-MCNC: 81 MG/DL (ref ?–200)
CHOLEST/HDLC SERPL: 1.98 {RATIO} (ref ?–5)
CO2 SERPL-SCNC: 25 MEQ/L (ref 21–32)
CREAT SERPL-MCNC: 0.78 MG/DL (ref 0.7–1.3)
EST. AVERAGE GLUCOSE BLD GHB EST-MCNC: 114 MG/DL (ref 60–110)
GFR SERPL CREATININE-BSD FRML MDRD: > 60 ML/MIN/{1.73_M2} (ref 42–?)
GLUCOSE SERPL-MCNC: 91 MG/DL (ref 70–100)
HCT VFR BLD AUTO: 36.7 % (ref 42–52)
HDLC SERPL-MCNC: 41 MG/DL (ref 40–?)
HGB BLD-MCNC: 10.6 G/DL (ref 13.5–17.5)
LDLC SERPL CALC-MCNC: 17 MG/DL (ref ?–100)
MCH RBC QN AUTO: 26.8 PG (ref 27–33)
MCHC RBC AUTO-ENTMCNC: 28.9 G/DL (ref 32–36.5)
MCV RBC AUTO: 92.7 FL (ref 80–96)
NONHDLC SERPL-MCNC: 40 MG/DL
PLATELET # BLD AUTO: 285 10^3/UL (ref 150–450)
POTASSIUM SERPL-SCNC: 3.5 MEQ/L (ref 3.5–5.1)
PROT SERPL-MCNC: 6.9 GM/DL (ref 6.4–8.2)
RBC # BLD AUTO: 3.96 10^6/UL (ref 4.3–6.1)
SODIUM SERPL-SCNC: 144 MEQ/L (ref 136–145)
TRIGL SERPL-MCNC: 113 MG/DL (ref ?–150)
WBC # BLD AUTO: 12.7 10^3/UL (ref 4–10)

## 2020-11-10 RX ADMIN — LEVETIRACETAM SCH MG: 250 TABLET, FILM COATED ORAL at 10:03

## 2020-11-10 RX ADMIN — LEVETIRACETAM SCH MG: 250 TABLET, FILM COATED ORAL at 20:46

## 2020-11-10 RX ADMIN — SODIUM CHLORIDE SCH MLS/HR: 9 INJECTION, SOLUTION INTRAVENOUS at 15:00

## 2020-11-10 RX ADMIN — INSULIN LISPRO SCH UNITS: 100 INJECTION, SOLUTION INTRAVENOUS; SUBCUTANEOUS at 12:00

## 2020-11-10 RX ADMIN — CEFEPIME HYDROCHLORIDE SCH MLS/HR: 1 INJECTION, POWDER, FOR SOLUTION INTRAMUSCULAR; INTRAVENOUS at 23:56

## 2020-11-10 RX ADMIN — INSULIN LISPRO SCH UNITS: 100 INJECTION, SOLUTION INTRAVENOUS; SUBCUTANEOUS at 16:19

## 2020-11-10 RX ADMIN — SIMVASTATIN SCH MG: 20 TABLET, FILM COATED ORAL at 20:48

## 2020-11-10 RX ADMIN — INSULIN LISPRO SCH UNITS: 100 INJECTION, SOLUTION INTRAVENOUS; SUBCUTANEOUS at 06:00

## 2020-11-10 RX ADMIN — DEXTROSE MONOHYDRATE SCH MLS/HR: 50 INJECTION, SOLUTION INTRAVENOUS at 13:28

## 2020-11-10 RX ADMIN — SODIUM CHLORIDE SCH MLS/HR: 9 INJECTION, SOLUTION INTRAVENOUS at 01:12

## 2020-11-10 RX ADMIN — SODIUM CHLORIDE SCH MLS/HR: 9 INJECTION, SOLUTION INTRAVENOUS at 15:50

## 2020-11-10 RX ADMIN — DOCUSATE SODIUM SCH MG: 100 CAPSULE, LIQUID FILLED ORAL at 10:03

## 2020-11-10 RX ADMIN — CEFEPIME HYDROCHLORIDE SCH MLS/HR: 1 INJECTION, POWDER, FOR SOLUTION INTRAMUSCULAR; INTRAVENOUS at 10:03

## 2020-11-10 NOTE — IPNPDOC
Date Seen


The patient was seen on 11/10/20.





Progress Note


SUBJECTIVE: Patient was seen and examined at bedside this morning, appears 

comfortable in bed. noted foul odor emanating from sacral decubitus ulcer. 

Patient denies fevers, chills, shortness of breath, chest pain, palpitations. 

Acute events overnight. Vital signs stable.





OBJECTIVE


PHYSICAL EXAMINATION:


VITAL SIGNS: please see below


General: NAD, comfortable


HEENT: PERRLA, EOMI, sclerae clear


Neck: supple, normal ROM, no JVD


Respiratory: lungs CTAB, no wheeze, no rales, no crackles


CVS: RRR, normal S1, S2, no murmurs


Abdo: soft, no masses, no hepatosplenomegaly, BS+, no rebound tenderness


Extremities: no edema, pulses 2+


MSK: Decreased ROM in all 4 extremities.


Skin: Large stage IV decubitus, pressure ulcer with significant necrosis and 

purulent foul-smelling discharge. Tender to touch.


Neuro: no focal neuro deficits, moving all 4 extremities, CN2-12 intact. 

Strength 4/5 in the right lower extremity. No nystagmus. 


Psych: calm, cooperative, AAO x 3





LABORATORY DATA, IMAGING STUDIES, MICROBIOLOGY: Please see below.





DVT prophylaxis ordered?: Yes, Xarelto





ASSESSMENT: 69-year-old male with past medical history of atrial fibrillation, 

CVA secondary to intracranial bleed 05/20/2020, hypertension, 

anxiety/depression, hyperlipidemia, diabetes mellitus type 2, history of DVT of 

the right leg, chronic back and leg pain, GERD, decubitus sacral ulcer admitted 

under inpatient status for hypotension likely 2/2 to dehydration and decreased 

by mouth intake, ? failure to thrive, stage IV decubitus ulcer requiring 

debridement and treatment.





PLAN:  


#Hypotension likely 2/2 to dehydration, decreased PO intake 


-Hx of HTN on carvedilol, lisinopril at home 


-BP 88/60 but after 500 cc bolus and continuous fluids --> 102/58 with MAP >65. 


-C/w IVFs at 100 cc/hr, holding all antihypertensive medications. Encouraging 

fluids Q2hrs while awake. 


-Monitor on tele





#Failure to thrive 


-Malnutrition, incr weakness, wt loss, decreased appetite, incr depression 


-Poor appetite 2/2 to early satiety


-Full nutritional assessment ordered, address depression and consider appetite 

stimulant. 





#Early satiety 2/2 to unknown cause


-R/o cancer, gastroparesis with history of DM type II


-He denies abd pain, n/v/d but admits to aversion of some foods, not having 

appetite. 


-He denies difficulty swallowing 


- CT abdo showing no acute abnormality in abdomen.


- reglan.


- ordered gastric emptying study after sacra wound debridement.





#Stage 4 sacral decubitus ulcer/ Osteomyelitis


-WBC 11K, afebrile. 


-Poor nutritional status, Hx of PAD/PVD 


-Very foul smell, purulent, extensive wound with necrotic tissue 


-Advanced wound care consult placed, discussed with Dr. Stillerman


- Surgery consult placed - Dr. Barrett will debride in OR and placed diverting 

colostomy on 11/22/20


- Osteomyelitis on CT.


-c/w cefepime, vancomycin





#Protein calorie malnutrition  


-Albumin low at 1.9, poor appetite


-Full nutritional assessment


- recs appreciated. Changed diet to regular to improve PO intake. Added mashed 

potatoes, beneprotein in milkshake





#Atrial fibrillation 


-rate controlled. 


-Hold BB, c/w xarelto





#Hx of intracranial bleed, CVA with right side weakness 


-No increased weakness from baseline. No new deficits


-C/w statin, on xarelto 


-PT/OT 





#Anxiety/depression 


-Worsened over the past several months


-Currently on buspirone and fluoxetine but these do not seem to help 


-Consider psych consult to help with management of meds 





#Hx of DVT right leg 


-No increased swelling of legs 


-C/w xarelto 





#Chronic back pain, leg pain 


-C/w home medications





#DM type II 


-Consistent carb diet 


- A1c 5.6


- lipids wnl





#HLD


-C/w statin 





#? seizure d/o 


-C/w keppra 





#GERD 


-PPI 





#DVT px 


- xarelto 





DISPOSITION: Admitted under inpatient status. Will likely need rehab at 

discharge. Wife can be contacted at [phone].





VS, I&O, 24H, Carmen


Vital Signs/I&O





Vital Signs








  Date Time  Temp Pulse Resp B/P (MAP) Pulse Ox O2 Delivery O2 Flow Rate FiO2


 


11/10/20 12:18 98.1 101 20 104/74 (84) 96 Room Air  














I&O- Last 24 Hours up to 6 AM 


 


 11/10/20





 06:00


 


Intake Total 700 ml


 


Output Total 0 ml


 


Balance 700 ml











Laboratory Data


24H LABS


Laboratory Tests 2


11/9/20 15:50: 


POC Glucose (Misc Panel) 106H, POC Sodium (Misc Panel) 145, POC Potassium (Misc 

Panel) 3.9, POC Chloride (Misc Panel) 109, POC Total CO2 (Misc Panel) 29.0H, POC

Blood Urea Nitrogen (Misc Panel 45H, POC Ionized Calcium (Misc Panel) 4.7, POC 

Creatinine (Misc Panel) 1.0, POC Hematocrit (Misc Panel) 36.0L


11/9/20 15:52: 


Immature Granulocyte % (Auto) 0.6, Neutrophils (%) (Auto) 83.4H, Lymphocytes (%)

(Auto) 7.7L, Monocytes (%) (Auto) 7.5H, Eosinophils (%) (Auto) 0.7, Basophils 

(%) (Auto) 0.1, Neutrophils # (Auto) 9.3H, Lymphocytes # (Auto) 0.9L, Monocytes 

# (Auto) 0.8, Eosinophils # (Auto) 0.1, Basophils # (Auto) 0.0, Nucleated Red 

Blood Cells % (auto) 0.0, POC Troponin I (Misc) 0.00, Blood Gas Bicarbonate 

Standard 24.4, Venous Blood pH 7.313L, Venous Blood Partial Pressure CO2 56.0H, 

Venous Blood Partial Pressure O2 38.4, Venous Blood Total Carbon Dioxide 29.5H, 

Venous Blood HCO3 27.8H, Venous Blood Oxygen Saturation 67.8, Venous Blood Base 

Excess 0.6, Osmolality 310H, Lactic Acid Level 1.5, Total Bilirubin 0.4, Direct 

Bilirubin 0.2, Aspartate Amino Transf (AST/SGOT) 15, Alanine Aminotransferase 

(ALT/SGPT) 7L, Alkaline Phosphatase 146H, Total Creatine Kinase 62, Total 

Protein 6.9, Albumin 1.9L, Albumin/Globulin Ratio 0.4, Thyroid Stimulating 

Hormone (TSH) 0.724


11/9/20 18:50: Coronavirus (COVID-19)(PCR) NEGATIVE


11/9/20 22:48: Bedside Glucose (Misc Panel) 83


11/10/20 05:33: 


Nucleated Red Blood Cells % (auto) 0.0, Anion Gap 7L, Glomerular Filtration Rate

> 60.0, Estimated Mean Plasma Glucose 114H, Hemoglobin A1c 5.6, Calcium Level 

8.4L, Total Bilirubin 0.4, Aspartate Amino Transf (AST/SGOT) 13, Alanine 

Aminotransferase (ALT/SGPT) < 6L, Alkaline Phosphatase 133H, Total Protein 6.9, 

Albumin 1.7L, Albumin/Globulin Ratio 0.3, Triglycerides Level 113, Total 

Cholesterol 81, LDL Cholesterol 17, Non-HDL Cholesterol (LDL + VLDL) 40, Total 

HDL Cholesterol 41, Cholesterol/HDL Ratio 1.975


11/10/20 12:42: Bedside Glucose (Misc Panel) 118H


CBC/BMP


Laboratory Tests


11/9/20 15:52








11/10/20 05:33








Microbiology





Microbiology


11/9/20 Wound Culture, Received


          Pending


11/9/20 Blood Culture, Received


          Pending


11/9/20 Blood Culture, Received


          Pending











RUPA ANGULO MD       Nov 10, 2020 14:42

## 2020-11-10 NOTE — ECGEPIP
[facility] - ED

                                       

                                       Test Date:    2020

Pat Name:     DAMIAN STEEL        Department:   

Patient ID:   [id number]                 Room:         -

Gender:       Male                     Technician:   kathi

:          1950               Requested By: Malorie Carrizales 

Order Number: [id number]     Reading MD:   Neil Brown

                                 Measurements

Intervals                              Axis          

Rate:         81                       P:            

MI:           0                        QRS:          31

QRSD:         109                      T:            23

QT:           374                                    

QTc:          435                                    

                           Interpretive Statements

ATRIAL FIBRILLATION

INCOMPLETE RIGHT BUNDLE BRANCH BLOCK

ST DEVIATION AND MODERATE T-WAVE ABNORMALITY, CONSIDER ANTERIOR ISCHEMIA

BASELINE ARTIFACT AFFECTS INTERPRETATION

SIMILAR TO PRIOR ON SAME DATE

Electronically Signed on 11- 1:01:14 EST by Neil Brown

## 2020-11-10 NOTE — CR
DATE OF CONSULTATION:  11/10/2020



REASON FOR CONSULTATION:  Decubitus ulcer.



HISTORY OF PRESENT ILLNESS:  The patient is a 69-year-old male with history of 
CVA secondary to intracranial bleed back in May of this year. Since then he has 
been at home dealing with depression and he has been essentially bed-bound. He 
presented to the emergency room due to weakness and poor appetite. He has been 
admitted for protein calorie malnutrition as well as large sacral decubitus 
ulcer. His wife has been taking care of it outpatient for him but he has not had
any formal wound care for it. He denies any pain to the area. No prior 
debridement or any procedures done for his decubitus ulcer and no other current 
complaints. When I asked him about appetite he says he takes a couple of bites 
and then he feels full and has no interest in eating and that is why he is just 
not eating at all.



MEDICAL HISTORY: 

1.  Afib on Xarelto.

2.  CVA.

3.  Hypertension.

4.  Hyperlipidemia.

5.  Anxiety.

6.  Depression.

7.  Diabetes.

8.  Right leg DVT.

9.  Chronic back and leg pain.

10.  GERD.

11.  Decubitus sacral ulcer.

12.  Chronic physical deconditioning.

13.  Peripheral vascular disease.

14.  Malnutrition.



SURGICAL HISTORY: 

1.  Perianal abscess surgeries.

2.  Fat removal from his breast.

3.  Tonsillectomy.



FAMILY HISTORY:  Noncontributory.



SOCIAL HISTORY:  He used to be a smoker, three packs per day, quit in May of 
this year. No alcohol or drug use.



ALLERGIES:  SULFA.



HOME MEDICATIONS: Please see Med[facility].



REVIEW OF SYSTEMS: Per positives and negative as stated in HPI.



PHYSICAL EXAMINATION:  

GENERAL: Awake, alert and oriented x3.

VITAL SIGNS: Temp 97.7, pulse 93, respirations 20, blood pressure 108/67, pulse 
ox 93% on room air.

HEENT: Pupils equally round and reactive to light and accommodation. 

HEART: S1, S2, regular rate and rhythm. 

LUNGS: Clear to auscultation bilaterally.

ABDOMEN: Soft, nontender, non-distended.

EXTREMITIES: Bilateral lower extremity pitting edema. 

SKIN: Sacral decubitus is a large unstageable ulcer. However, appears to be 
stage 4 with exposed bone. The total dimensions are unidentifiable at this point
with a large area of necrotic tissue.



LABS: White count 12.7, hemoglobin 10.6, platelets 285. Potassium 3.5, 
creatinine 0.78, albumin 1.7. 



IMAGING: CT abdomen and pelvis done last evening shows deep midline decubitus 
ulcer over the coccyx extending down to bone with air present within the coccyx 
indicative of osteomyelitis. No abscesses. Small right pleural effusion with 
adjacent atelectasis.



ASSESSMENT: The patient is a 69-year-old male with large presumably stage 4 
sacral decubitus ulcer with close proximity to the rectum. He has protein 
calorie malnutrition, history of CVA, hypertension, Afib.



RECOMMENDATION:  Proceed with excisional debridement of decubitus ulcer as well 
as robotic diverting colostomy.



Risks, benefits and alternatives were discussed in detail with the patient as 
well as his wife. They both understand the reasoning for the procedure and agree
to have it done. I will speak to the Hospitalist about this and plan on getting 
him on the operating room schedule for tomorrow.

LIV

## 2020-11-11 VITALS — SYSTOLIC BLOOD PRESSURE: 110 MMHG | DIASTOLIC BLOOD PRESSURE: 72 MMHG

## 2020-11-11 VITALS — SYSTOLIC BLOOD PRESSURE: 106 MMHG | DIASTOLIC BLOOD PRESSURE: 71 MMHG

## 2020-11-11 VITALS — SYSTOLIC BLOOD PRESSURE: 115 MMHG | DIASTOLIC BLOOD PRESSURE: 59 MMHG

## 2020-11-11 VITALS — DIASTOLIC BLOOD PRESSURE: 72 MMHG | SYSTOLIC BLOOD PRESSURE: 105 MMHG

## 2020-11-11 VITALS — SYSTOLIC BLOOD PRESSURE: 115 MMHG | DIASTOLIC BLOOD PRESSURE: 76 MMHG

## 2020-11-11 VITALS — SYSTOLIC BLOOD PRESSURE: 117 MMHG | DIASTOLIC BLOOD PRESSURE: 71 MMHG

## 2020-11-11 VITALS — DIASTOLIC BLOOD PRESSURE: 73 MMHG | SYSTOLIC BLOOD PRESSURE: 114 MMHG

## 2020-11-11 VITALS — DIASTOLIC BLOOD PRESSURE: 74 MMHG | SYSTOLIC BLOOD PRESSURE: 107 MMHG

## 2020-11-11 VITALS — SYSTOLIC BLOOD PRESSURE: 67 MMHG | DIASTOLIC BLOOD PRESSURE: 38 MMHG

## 2020-11-11 VITALS — SYSTOLIC BLOOD PRESSURE: 75 MMHG | DIASTOLIC BLOOD PRESSURE: 45 MMHG

## 2020-11-11 VITALS — SYSTOLIC BLOOD PRESSURE: 125 MMHG | DIASTOLIC BLOOD PRESSURE: 79 MMHG

## 2020-11-11 VITALS — DIASTOLIC BLOOD PRESSURE: 71 MMHG | SYSTOLIC BLOOD PRESSURE: 102 MMHG

## 2020-11-11 VITALS — SYSTOLIC BLOOD PRESSURE: 101 MMHG | DIASTOLIC BLOOD PRESSURE: 71 MMHG

## 2020-11-11 VITALS — SYSTOLIC BLOOD PRESSURE: 100 MMHG | DIASTOLIC BLOOD PRESSURE: 63 MMHG

## 2020-11-11 VITALS — SYSTOLIC BLOOD PRESSURE: 99 MMHG | DIASTOLIC BLOOD PRESSURE: 68 MMHG

## 2020-11-11 VITALS — SYSTOLIC BLOOD PRESSURE: 112 MMHG | DIASTOLIC BLOOD PRESSURE: 73 MMHG

## 2020-11-11 VITALS — SYSTOLIC BLOOD PRESSURE: 130 MMHG | DIASTOLIC BLOOD PRESSURE: 78 MMHG

## 2020-11-11 VITALS — DIASTOLIC BLOOD PRESSURE: 85 MMHG | SYSTOLIC BLOOD PRESSURE: 147 MMHG

## 2020-11-11 LAB
ALBUMIN SERPL BCG-MCNC: 1.5 GM/DL (ref 3.2–5.2)
ALT SERPL W P-5'-P-CCNC: 8 U/L (ref 12–78)
BILIRUB SERPL-MCNC: 0.4 MG/DL (ref 0.2–1)
BUN SERPL-MCNC: 16 MG/DL (ref 7–18)
BUN SERPL-MCNC: 17 MG/DL (ref 7–18)
CALCIUM SERPL-MCNC: 8 MG/DL (ref 8.8–10.2)
CALCIUM SERPL-MCNC: 8.5 MG/DL (ref 8.8–10.2)
CHLORIDE SERPL-SCNC: 113 MEQ/L (ref 98–107)
CHLORIDE SERPL-SCNC: 114 MEQ/L (ref 98–107)
CO2 SERPL-SCNC: 23 MEQ/L (ref 21–32)
CO2 SERPL-SCNC: 26 MEQ/L (ref 21–32)
CREAT SERPL-MCNC: 0.71 MG/DL (ref 0.7–1.3)
CREAT SERPL-MCNC: 0.78 MG/DL (ref 0.7–1.3)
CRP SERPL-MCNC: 11.6 MG/DL (ref 0–0.3)
GFR SERPL CREATININE-BSD FRML MDRD: > 60 ML/MIN/{1.73_M2} (ref 42–?)
GFR SERPL CREATININE-BSD FRML MDRD: > 60 ML/MIN/{1.73_M2} (ref 42–?)
GLUCOSE SERPL-MCNC: 104 MG/DL (ref 70–100)
GLUCOSE SERPL-MCNC: 96 MG/DL (ref 70–100)
HCT VFR BLD AUTO: 32.4 % (ref 42–52)
HCT VFR BLD AUTO: 34.8 % (ref 42–52)
HGB BLD-MCNC: 10 G/DL (ref 13.5–17.5)
HGB BLD-MCNC: 9.2 G/DL (ref 13.5–17.5)
MCH RBC QN AUTO: 26.5 PG (ref 27–33)
MCHC RBC AUTO-ENTMCNC: 28.7 G/DL (ref 32–36.5)
MCV RBC AUTO: 92.3 FL (ref 80–96)
PLATELET # BLD AUTO: 239 10^3/UL (ref 150–450)
POTASSIUM SERPL-SCNC: 3.1 MEQ/L (ref 3.5–5.1)
POTASSIUM SERPL-SCNC: 3.6 MEQ/L (ref 3.5–5.1)
PROT SERPL-MCNC: 6.4 GM/DL (ref 6.4–8.2)
RBC # BLD AUTO: 3.77 10^6/UL (ref 4.3–6.1)
SODIUM SERPL-SCNC: 145 MEQ/L (ref 136–145)
SODIUM SERPL-SCNC: 145 MEQ/L (ref 136–145)
WBC # BLD AUTO: 13.1 10^3/UL (ref 4–10)

## 2020-11-11 PROCEDURE — 0QBS0ZZ EXCISION OF COCCYX, OPEN APPROACH: ICD-10-PCS | Performed by: SURGERY

## 2020-11-11 PROCEDURE — 0DBN4ZZ EXCISION OF SIGMOID COLON, PERCUTANEOUS ENDOSCOPIC APPROACH: ICD-10-PCS | Performed by: SURGERY

## 2020-11-11 PROCEDURE — 0D1N4J4 BYPASS SIGMOID COLON TO CUTANEOUS WITH SYNTHETIC SUBSTITUTE, PERCUTANEOUS ENDOSCOPIC APPROACH: ICD-10-PCS | Performed by: SURGERY

## 2020-11-11 RX ADMIN — DEXTROSE MONOHYDRATE SCH MLS/HR: 50 INJECTION, SOLUTION INTRAVENOUS at 00:49

## 2020-11-11 RX ADMIN — SODIUM CHLORIDE SCH MLS/HR: 9 INJECTION, SOLUTION INTRAVENOUS at 00:50

## 2020-11-11 RX ADMIN — Medication SCH MCG: at 15:45

## 2020-11-11 RX ADMIN — POTASSIUM CHLORIDE SCH MLS/HR: 7.46 INJECTION, SOLUTION INTRAVENOUS at 11:00

## 2020-11-11 RX ADMIN — INSULIN LISPRO SCH UNITS: 100 INJECTION, SOLUTION INTRAVENOUS; SUBCUTANEOUS at 18:18

## 2020-11-11 RX ADMIN — DEXTROSE MONOHYDRATE SCH MLS/HR: 50 INJECTION, SOLUTION INTRAVENOUS at 12:35

## 2020-11-11 RX ADMIN — POTASSIUM CHLORIDE SCH MLS/HR: 7.46 INJECTION, SOLUTION INTRAVENOUS at 10:17

## 2020-11-11 RX ADMIN — DOCUSATE SODIUM SCH MG: 100 CAPSULE, LIQUID FILLED ORAL at 17:00

## 2020-11-11 RX ADMIN — INSULIN LISPRO SCH UNITS: 100 INJECTION, SOLUTION INTRAVENOUS; SUBCUTANEOUS at 06:00

## 2020-11-11 RX ADMIN — POTASSIUM CHLORIDE SCH MLS/HR: 7.46 INJECTION, SOLUTION INTRAVENOUS at 10:00

## 2020-11-11 RX ADMIN — INSULIN LISPRO SCH UNITS: 100 INJECTION, SOLUTION INTRAVENOUS; SUBCUTANEOUS at 00:00

## 2020-11-11 RX ADMIN — DEXTROSE MONOHYDRATE SCH MLS/HR: 5 INJECTION INTRAVENOUS at 12:00

## 2020-11-11 RX ADMIN — POTASSIUM CHLORIDE SCH MLS/HR: 7.46 INJECTION, SOLUTION INTRAVENOUS at 09:07

## 2020-11-11 RX ADMIN — DEXTROSE MONOHYDRATE SCH MLS/HR: 5 INJECTION INTRAVENOUS at 18:18

## 2020-11-11 RX ADMIN — LEVETIRACETAM SCH MG: 250 TABLET, FILM COATED ORAL at 17:00

## 2020-11-11 RX ADMIN — INSULIN LISPRO SCH UNITS: 100 INJECTION, SOLUTION INTRAVENOUS; SUBCUTANEOUS at 12:00

## 2020-11-11 RX ADMIN — Medication SCH MCG: at 15:30

## 2020-11-11 RX ADMIN — Medication SCH MCG: at 15:35

## 2020-11-11 NOTE — IPNPDOC
Text Note


Date of Service


The patient was seen on 11/11/20.





NOTE


No acute events overnight. I discussed surgery again with him this morning, and 

he is still in agreement. The plan will be to take him to the OR around noon 

today for debridement of his decubitus ulcer as well as a diverting colostomy. 

He will sign the consent in pre-op prior to the surgery. 





VSSAF





NAD





labs - below





A) 70y/o male s/p stroke with protein calorie malnutrition, and large stage 4 

decubitus ulcer. 





P) OR for ulcer debridement and diverting colostomy


consent will be signed in pre-op


all questions answered


no changes to H+P. 





Fermin Barrett DO





VS,Carmen, I+O


VS, Carmen, I+O


Laboratory Tests


11/11/20 05:20











Vital Signs








  Date Time  Temp Pulse Resp B/P (MAP) Pulse Ox O2 Delivery O2 Flow Rate FiO2


 


11/11/20 07:30 98.6 98 22 110/72 (85) 97 Nasal Cannula 2.0 














I&O- Last 24 Hours up to 6 AM 


 


 11/11/20





 06:00


 


Intake Total 2120 ml


 


Output Total 500 ml


 


Balance 1620 ml

















MARY BARRETT DO            Nov 11, 2020 09:09

## 2020-11-11 NOTE — IPNPDOC
Date Seen


The patient was seen on 20.





Progress Note


SUBJECTIVE:RN called re: respiratory arrest. Patient was lethargic on arrival to

ICU from the 


recovery room after receiving fentanyl and versed, and was unable to state his 

. On Tele,


he became bradycardic and hypotensivewith systolic blood pressure in the 70's


despite neosynephrine, and NS IV fluid bolus 1Liter was started, and levophed 

ordered. 


He was given IV flumazenil and IV narcan while being bagged, but with no change


in his respiratory status which became more agonal. ICU Attending Dr. Cantrell was 

alerted, but


since the patient is DNI/DNR, pt's wishes were honored, and we discontinued 

respiratory support


and left him with ventimask. His wife was informed, and she confirmed his wishes

of DNI/DNR.


Present at the bedside, his wife said, "He did not want to suffer. We don't want

 him to suffer."


Patient was made COMFORT MEASURES ONLY/DNR/DNI. His Surgeon, Dr. Barrett, was 

informed


of the recent events.   





OBJECTIVE


PHYSICAL EXAMINATION:


VITAL SIGNS: Please see below. 


GENERAL: unresponsive. severe respiratory distress with use of accessory 

respiratory muscles


HEENT: facemask. agonal breathing


CARDIOVASCULAR: S1S2 irregularly irregular


RESPIRATORY: agonal . diminished. 


ABDOMINAL: +BS soft postop


EXTREMITIES: -cyanosis 


NEUROLOGICAL:unresponsive.





LABORATORY DATA, IMAGING STUDIES, MICROBIOLOGY: Please see below.





ASSESSMENT AND PLAN: 70 y/o s/p colostomy and stage 4 decubiti debridement


returned to ICU with respiratory distress and arrest. DNR/DNI wishes were 


honored. Pt has been made CMO.





PROBLEMS:


Acute Respiratory Arrest


Bradycardia


Hypotension


Acute Encephalopathy





PLAN: 


Despite flumazenil and narcan, neosynephrine, ns iv bolus, patient 


clinically worsened, now with agonal breathing. Per his prior wishes of 


NO INTUBATION, patient was made comfort measures only with the wife


signing a new MOLST FORM,and confirming the patient's final wishes.





VS, I&O, 24H, Fishbone


Vital Signs/I&O





Vital Signs








  Date Time  Temp Pulse Resp B/P (MAP) Pulse Ox O2 Delivery O2 Flow Rate FiO2


 


20 19:35  82 6 100/63 (75) 93   


 


20 18:59      Nasal Cannula 4.0 


 


20 17:00 96.8       














I&O- Last 24 Hours up to 6 AM 


 


 20





 06:00


 


Intake Total 2120 ml


 


Output Total 500 ml


 


Balance 1620 ml











Laboratory Data


24H LABS


Laboratory Tests 2


11/10/20 23:55: Bedside Glucose (Misc Panel) 94


20 05:20: 


Nucleated Red Blood Cells % (auto) 0.0, Anion Gap 8, Glomerular Filtration Rate 

> 60.0, Calcium Level 8.0L, Total Bilirubin 0.4, Aspartate Amino Transf 

(AST/SGOT) 17, Alanine Aminotransferase (ALT/SGPT) 8L, Alkaline Phosphatase 

130H, Total Protein 6.4, Albumin 1.5L, Albumin/Globulin Ratio 0.3


20 11:02: Vancomycin Level Trough 26.9*H


20 11:16: Lactic Acid Level 1.6


20 11:18: 


Anion Gap 6L, Glomerular Filtration Rate > 60.0, Calcium Level 8.5L


20 17:46: 


Erythrocyte Sedimentation Rate 81H, Lactic Acid Level 1.1, C-Reactive Protein, 

Quantitative 11.60H


20 18:04: Bedside Glucose (Misc Panel) 161H


CBC/BMP


Laboratory Tests


20 05:20








20 11:18








20 16:13








Microbiology





Microbiology


20 Blood Culture, Received


           Pending


20 Blood Culture, Received


           Pending


20 Wound Culture, Received


          Pending


20 Blood Culture - Preliminary, Resulted


          No Growth after 48 hours. All Specime...


20 Blood Culture - Preliminary, Resulted


          No Growth after 48 hours. All Specime...











JEANNETTE GILMORE MD            2020 19:48

## 2020-11-11 NOTE — IPNPDOC
Date Seen


The patient was seen on 11/11/20.





Progress Note


SUBJECTIVE: Patient was seen and examined at bedside this morning, appears 

comfortable in bed.  Patient denies fevers, chills, shortness of breath, chest 

pain, palpitations. Patient for OR today for debridement of sacral pressure 

ulcer and diverting colostomy by Dr. Barrett.





OBJECTIVE


PHYSICAL EXAMINATION:


VITAL SIGNS: please see below


General: NAD, comfortable


HEENT: PERRLA, EOMI, sclerae clear


Neck: supple, normal ROM, no JVD


Respiratory: lungs CTAB, no wheeze, no rales, no crackles


CVS: RRR, normal S1, S2, no murmurs


Abdo: soft, no masses, no hepatosplenomegaly, BS+, no rebound tenderness


Extremities: no edema, pulses 2+


MSK: Decreased ROM in all 4 extremities.


Skin: Large stage IV decubitus, pressure ulcer with significant necrosis and 

purulent foul-smelling discharge. Tender to touch.


Neuro: no focal neuro deficits, moving all 4 extremities, CN2-12 intact. 

Strength 4/5 in the right lower extremity. No nystagmus. 


Psych: calm, cooperative, AAO x 3





LABORATORY DATA, IMAGING STUDIES, MICROBIOLOGY: Please see below.





DVT prophylaxis ordered?: Yes, Xarelto





ASSESSMENT: 69-year-old male with past medical history of atrial fibrillation, 

CVA secondary to intracranial bleed 05/20/2020, hypertension, 

anxiety/depression, hyperlipidemia, diabetes mellitus type 2, history of DVT of 

the right leg, chronic back and leg pain, GERD, decubitus sacral ulcer admitted 

under inpatient status for hypotension likely 2/2 to dehydration and decreased 

by mouth intake, ? failure to thrive, stage IV decubitus ulcer requiring 

debridement and treatment. For OR today for debridement of sacral pressure ulcer

and diverting colostomy.





PLAN: 





#Stage 4 sacral decubitus ulcer/ Osteomyelitis


-WBC 11K, afebrile. 


-Poor nutritional status, Hx of PAD/PVD 


-Very foul smell, purulent, extensive wound with necrotic tissue 


-Advanced wound care consult placed, discussed with Dr. Stillerman, will perform

video consult after debridement.


- Surgery following, for OR today for debridement and diverting colostomy


- Gonzalez in place


- Osteomyelitis on CT.


- switch cefepime to zosyn for anaerobic coverage, c/w vancomycin.





#Failure to thrive 


-Malnutrition, incr weakness, wt loss, decreased appetite, incr depression 


-Poor appetite 2/2 to early satiety, will consider NM gastric emptying study if 

patient able to tolerate


-Nutrion eval





#Early satiety 2/2 to unknown cause


-R/o cancer, gastroparesis with history of DM type II


-He denies abd pain, n/v/d but admits to aversion of some foods, not having 

appetite. 


-He denies difficulty swallowing 


- CT abdo showing no acute abnormality in abdomen.


- reglan.


- will consider NM gastric emptying study if patient able to tolerate





#Severe protein calorie malnutrition  


-Albumin low at 1.9, poor appetite


-Full nutritional assessment


- recs appreciated. Changed diet to regular to improve PO intake. Added mashed 

potatoes, beneprotein in milkshake





#Atrial fibrillation 


-rate controlled. 


-Hold BB, c/w xarelto





#Hx of intracranial bleed, CVA with right side weakness 


-No increased weakness from baseline. No new deficits


-C/w statin, on xarelto 


-PT/OT 





#Anxiety/depression 


-Worsened over the past several months


-Currently on buspirone and fluoxetine but these do not seem to help 


-Consider psych consult to help with management of meds





#Hx of DVT right leg 


-No increased swelling of legs 


-C/w xarelto 





#Chronic back pain, leg pain 


-C/w home medications





#DM type II 


-Consistent carb diet 


- A1c 5.6


- lipids wnl





#HLD


-C/w statin 





#? seizure d/o 


-C/w keppra 





#GERD 


-PPI 





#DVT px 


- xarelto 





DISPOSITION: Admitted under inpatient status. Will likely need rehab at 

discharge. Wife can be contacted at [phone].





VS, I&O, 24H, Fishbone


Vital Signs/I&O





Vital Signs








  Date Time  Temp Pulse Resp B/P (MAP) Pulse Ox O2 Delivery O2 Flow Rate FiO2


 


11/11/20 18:30  74 16 99/68 (78) 94 Nasal Cannula 2.0 


 


11/11/20 17:00 96.8       














I&O- Last 24 Hours up to 6 AM 


 


 11/11/20





 06:00


 


Intake Total 2120 ml


 


Output Total 500 ml


 


Balance 1620 ml











Laboratory Data


24H LABS


Laboratory Tests 2


11/10/20 23:55: Bedside Glucose (Misc Panel) 94


11/11/20 05:20: 


Nucleated Red Blood Cells % (auto) 0.0, Anion Gap 8, Glomerular Filtration Rate 

> 60.0, Calcium Level 8.0L, Total Bilirubin 0.4, Aspartate Amino Transf 

(AST/SGOT) 17, Alanine Aminotransferase (ALT/SGPT) 8L, Alkaline Phosphatase 

130H, Total Protein 6.4, Albumin 1.5L, Albumin/Globulin Ratio 0.3


11/11/20 11:02: Vancomycin Level Trough 26.9*H


11/11/20 11:16: Lactic Acid Level 1.6


11/11/20 11:18: 


Anion Gap 6L, Glomerular Filtration Rate > 60.0, Calcium Level 8.5L


11/11/20 17:46: 


Erythrocyte Sedimentation Rate 81H, Lactic Acid Level 1.1, C-Reactive Protein, 

Quantitative 11.60H


11/11/20 18:04: Bedside Glucose (Misc Panel) 161H


CBC/BMP


Laboratory Tests


11/11/20 05:20








11/11/20 11:18








11/11/20 16:13








Microbiology





Microbiology


11/11/20 Blood Culture, Received


           Pending


11/11/20 Blood Culture, Received


           Pending


11/9/20 Wound Culture, Received


          Pending


11/9/20 Blood Culture - Preliminary, Resulted


          No Growth after 48 hours. All Specime...


11/9/20 Blood Culture - Preliminary, Resulted


          No Growth after 48 hours. All Specime...











RUPA ANGULO MD       Nov 11, 2020 19:34

## 2020-11-12 NOTE — REP
INDICATION:

sepsis



COMPARISON:

11/09/2020



TECHNIQUE:

Portable AP view of the chest



FINDINGS:

Examination is somewhat limited by technique, positioning, and underpenetration.

Medial right/retrocardiac opacity suggests consolidation and/or collapse to the right

lower lobe.  A small associated right pleural effusion is also suspected.  No

pneumothorax.  Visualized mediastinum and cardiac silhouette are normal.  Skeletal

structures are intact.



IMPRESSION:

Findings suggestive of consolidation/collapse to the right lower lobe along with small

right pleural effusion.





<Electronically signed by Zion Brandon > 11/12/20 0256

## 2020-11-12 NOTE — CCN
DATE:  11/11/2020



SUBJECTIVE:  I was originally called by Dr. Guillaume to entertain placing a 
central line in this patient who just came back from the PACU. I had not been 
involved preoperatively. Apparently this is a gentleman who had a hemorrhagic 
stroke, who has severe chronic protein malnourishment, severe extremely large 
sacral decubitus, went for debridement and diverting colostomy. On arrival to 
the ICU,  according to the nursing staff, his blood pressure was 95 systolic and
was on Dez-Synephrine. I asked Dr. Guillaume how much fluid the patient 
received and he said one liter. I requested before placing a central line in 
someone who clearly had outlined that he did not want resuscitation, that he 
administer additional fluid, as I think he was likely volume deplete, possibly 
septic, and that he would require volume administration first and his systolic 
blood pressure at that point in time was okay. I called back to the ICU one hour
later to insure the patient did not need a central line. At that point in time 
they were tapering down his peripheral Dez-Synephrine and systolic blood 
pressure was 125.   Approximately 45 minutes after that, I was called by a nurse
who did not have much information on the patient but was reaching out for my 
assistance. She states the patient was bradycardic, in the 30s and was very 
hypotensive. I said Im on my way, however peripheral Dopamine could be 
administered until I arrived at the ICU. On my arrival to the ICU, they were 
bagging the patient. The patient had a blue bracelet on. It had been confirmed 
that the patient is DNR. I asked for bagging to stop. Upon review, the patient 
actually stopped breathing before they became bradycardic which makes quite a 
bit of sense. Therefore this was a respiratory event, not necessarily from 
hypotension. 



On my arrival, his blood pressure was again 120s to 130s systolic and his 
heart rate was 80. The nurse states the heart rate and blood pressure improved 
with bagging along with oxygen saturation.  This had improved with bag mask 
ventilation. I explained his issue is the fact that he needs to be intubated. He
was agonal breathing. Without bagging he was breathing 2-3 times a minute. 
However the patient had previously expressed wishes that he did not want 
resuscitation and therefore no intubation, no resuscitation was desired, and 
this was clearly marked on his Advanced Directives. I discussed this with the 
Hospitalist, who was at the patient's bedside, Dr. Walls. She was in 
agreement. She had called the patient's wife and explained the situation. The 
patient's wife was present when the patient passed. Without intubation and 
mechanical ventilation, I do not feel that any therapy would be beneficial and 
therefore nothing else was performed because the main issue was ventilatory 
failure at that time. 


LIV

## 2020-11-12 NOTE — RO
DATE OF OPERATION:  11/11/2020



PREOPERATIVE DIAGNOSIS:  Sacral decubitus ulcer, unstageable.



POSTOPERATIVE DIAGNOSIS:  Stage 4 sacral decubitus ulcer.



PROCEDURE:  Sharp excisional debridement of sacral decubitus ulcer through skin,
subcutaneous tissue, fascia, muscle and portions of the coccygeal bone followed 
by robotic diverting sigmoid colostomy.



SURGEON: Perico Barrett DO



ASSISTANT: Peyton Qureshi



ANESTHESIA: General.



EBL: 25 mL.



COMPLICATIONS: None.



INDICATIONS FOR PROCEDURE: The patient is a 69-year-old male who underwent 
hemorrhagic stroke back in May. Since then he has been essentially bed bound. He
presented to the hospital two days ago with weakness and protein calorie 
malnutrition with large likely stage 4 decubitus ulcer. He had signs of 
osteomyelitis in the bone on CT scan but on physical exam it was difficult to 
discern the size or depth. Recommendation was to take him to the operating room 
for sharp excisional debridement as well as diverting colostomy due to the close
proximity and large size of the wound very close to the wound. Risks and 
benefits of the procedure not limited to but including bleeding, infection, 
hernia formation, damage to surrounding structures, need for further surgery 
were discussed in detail with the patient and informed consent was obtained and 
procedure was planned.



DESCRIPTION OF PROCEDURE: The patient was brought back to operating room 7, 
after successful sedation he was placed in prone position. The presacral area 
was sterilely prepped and draped with Betadine. Time out was done confirming 
proper patient, proper procedure. Following that, using 15-blade scalpel, sharp 
excision was used around the periphery of the wound working my way toward the 
middle. Skin and subcutaneous tissue, muscle, fascia was all dissected all the 
way down to the sacrum and coccygeal bone. All the necrotic tissue was debrided 
and removed. Portions of the coccyx were then broken off and removed as well, it
was free floating and mobile. Once that was completed the wound was cleaned, 
packed with dry 4 x 4 gauze and covered with ABDs and tape ending that portion 
of the procedure.



The patient was flipped back into the supine position onto the other operating 
room bed. Abdomen was sterilely prepped and draped with Chlorhexidine. Time out 
was done again. Following that an 8 mm incision was made in left upper quadrant.
Veress needle inserted and abdomen was insufflated to 15 mmHg. Veress needle was
removed. 8 mm Optiview robotic port was used to gain access to the abdomen. Once
the abdomen was entered there were no signs of any injury from Veress needle. 
Three more ports were placed across the right side of the abdomen and the right 
lower quadrant port was 12 mm port to be used for the stapler. Once the ports 
were in place the robot was docked to the ports. From the console the sigmoid 
colon was identified. It was elevated up in the air just around the pelvic brim.
The mesentery was dissected free using the vessel sealer. Once a window was 
created in the mesentery 60 load green stapler was used to transect the sigmoid 
colon. The mesentery was then dissected proximally until there was sufficient 
length freely mobile to bring it up toward the abdominal wall. Once that was 
completed the regular laparoscopic grasper was passed in through the port in the
left upper quadrant and grabbed onto the sigmoid colon. The rest of the ports 
were undocked and removed. Abdomen was desufflated. The skin incision was 
created in the left lower quadrant using cautery. The subcutaneous fat was all 
dissected free and excised all the way down to the level of the fascia. The 
fascia was then opened and the colon was grabbed with a Ace and brought out 
through the abdominal wall. The staple line was then removed and the colon was 
sutured to the skin using 3-0 Vicryl interrupted sutures. Once that was 
completed the abdomen was cleaned and dried. Staples were placed over the port 
sites. The ostomy appliance was put in place. Local was injected into the skin 
at the incision sites. The abdomen was cleaned and dried again. 4 x 4s and tape 
were applied. This ended the procedure.


LIV

## 2021-01-08 NOTE — DS.PDOC
Discharge Summary


General


Date of Admission


2020 at 17:17


Date of Discharge


20 





Discharge Summary


CONSULTANTS:


GENERAL SURGERY DR BARRETT


PULMONOLOGIST DR BLOUNT





DISCHARGE DIAGNOSES:


stage 4 sacral decubitus ulcer,


coccygeal osteomyelitis


dehydration 


failure to thrive


Acute Respiratory Arrest


Bradycardia


Hypotension


Acute Encephalopathy


Chronic atrial fibrillation on xarelto


CVA / to ICB 2020


 HTN


 HLD


 Anxiety/depression


 DM type II 


Hx of DVT right leg 


 Chronic back and leg pain


GERD


 Chronic physical deconditioning 


PVD 


Malnutrition





HOSPITAL COURSE:


69-year-old male with past medical history of atrial fibrillation, CVA secondary

to intracranial bleed 2020, hypertension, anxiety/depression, 

hyperlipidemia, diabetes mellitus type 2, history of DVT of the right leg,  

chronic back and leg pain, GERD, decubitus sacral ulcer, PAD/PVD who presented 

to [facility] emergency room with failure to thrive with generalized weakness 

found in the ER to be hypotensive treated with ns 500ml iv bolus, and malodorous

necrotic stage 4 sacral decubiti, requiring debridement, and given iv vancomycin

and cefepime.  General Surgeon Dr. Barrett brought the patient to the OR on 

20 for Sharp excisional debridement of sacral decubitus ulcer through 

skin,subcutaneous tissue, fascia, muscle and portions of the coccygeal bone 

followed by robotic diverting sigmoid colostomy. Postperatively, patient was 

transferred to the ICU where the patient was found to be encephalopathic, 

bradycardic with agonal breathing. Dr. Blount was consulted. ICU RN called due to

worsening respiratory distress. Patient was lethargic on arrival to ICU from the

recovery room after receiving fentanyl and versed, and was unable to state his 

. On Tele,he became bradycardic and hypotensivewith systolic blood pressure 

in the 70'sdespite neosynephrine, and NS IV fluid bolus 1Liter was started, and 

levophed ordered. He was given IV flumazenil and IV narcan while being bagged, 

but with no changein his respiratory status which became more agonal. ICU 

Attending Dr. Blount was alerted, butsince the patient is DNI/DNR, pt's wishes 

were honored, and we discontinued respiratory supportand left him with ve

ntimask. His wife was informed, and she confirmed his wishes of DNI/DNR.Present 

at the bedside, his wife said, "He did not want to suffer. We don't want  him to

suffer."Patient was made COMFORT MEASURES ONLY/DNR/DNI. His Surgeon, Dr. Barrett,

was informed of the recent events.   Patient  with his wife at the 

bedside. 





PROCEDURES:


20


PREOPERATIVE DIAGNOSIS:  Sacral decubitus ulcer, unstageable.


POSTOPERATIVE DIAGNOSIS:  Stage 4 sacral decubitus ulcer.


PROCEDURE:  Sharp excisional debridement of sacral decubitus ulcer through 

skin,subcutaneous tissue, fascia, muscle and portions of the coccygeal bone 

followed by robotic diverting sigmoid colostomy.


SURGEON: Perico Barrett DO


ASSISTANT: Peyton Qureshi


ANESTHESIA: General.


EBL: 25 mL.


COMPLICATIONS: None.





IMAGING STUDIES;


Exam: CT Abdomen And Pelvis With Contrast 


Exam date and time: 2020 8:52 PM 


Age: 69 years old 


Clinical indication: Other: Early satiety, stage 4 decubitus, poss osteo? 





TECHNIQUE: 


Imaging protocol: Computed tomography of the abdomen and pelvis with 


intravenous contrast. 


Radiation optimization: All CT scans at this facility use at least one of these 


dose optimization techniques: automated exposure control; mA and/or kV 


adjustment per patient size (includes targeted exams where dose is matched to 


clinical indication); or iterative reconstruction. 


Contrast material: ISOVUE 370; Contrast volume: 100 ml; Contrast route: 


INTRAVENOUS (IV);  





COMPARISON: 


No relevant prior studies available. 





FINDINGS: 


Pleural space: Small dependent right pleural effusion a a dependent right 


basilar atelectasis is present. 





Liver: Liver appears normal with no focal abnormality. 


Gallbladder and bile ducts: Gallstones are present in the gallbladder lumen. No 


adjacent fluid or duct dilatation. 


Pancreas: Pancreas appears normal. No focal mass or peripancreatic 


inflammation. 


Spleen: Spleen appears homogeneous without focal mass. 


Adrenal glands: Adrenal glands are normal in appearance. 


Kidneys and ureters: Kidneys demonstrate simple fluid density cysts measuring 


up to 3 cm. No concerning renal lesion. No obstructive change. 


Stomach and bowel: No evidence of small bowel obstruction. No evidence of acute 


diverticulitis. 


Appendix: No evidence of appendicitis. 





Intraperitoneal space: No pneumoperitoneum. 


Vasculature: Atherosclerotic change present in the aorta, without aneurysm. 


Main portal and splenic veins enhance normally. 


Lymph nodes: No enlarged lymph nodes. 


Urinary bladder: Urinary bladder appears normal. 


Bones/joints: Degenerative changes are present in the hips, right worse than 


left, and in the lumbosacral spine. The the the soft tissue ulceration 


superficial to the distal sacrum and coccyx is present extending down to bone. 


There does appear to be intra osseous air suggesting osteomyelitis of the 


coccyx 


Soft tissues: No concerning focal abnormality of the extra-abdominal and pelvic 


soft tissues. 





IMPRESSION: 


1. Deep midline decubitus ulcer over the coccyx extending down to bone, with 


air present within the coccyx indicative of osteomyelitis. No abscess. 


2. Small right pleural effusion and adjacent atelectasis 





TIME SPENT ON DISCHARGE: 30 MINUTES.





Discharge Medications


Scheduled


Buspirone HCl (Buspirone HCl) 10 Mg Tablet, 10 MG PO TID, (Reported)


Carvedilol (Carvedilol) 12.5 Mg Tablet, 12.5 MG PO BID, (Reported)


   STARTED AT [facility] 


Docusate Sodium (Colace) 100 Mg Capsule, 100 MG PO DAILY, (Reported)


Fluoxetine Hcl (Fluoxetine HCl) 40 Mg Capsule, 40 MG PO DAILY, (Reported)


Levetiracetam (Levetiracetam) 750 Mg Tablet, 750 MG PO BID, (Reported)


Lisinopril (Lisinopril) 2.5 Mg Tablet, 2.5 MG PO DAILY, (Reported)


Metformin HCl (Metformin HCl) 500 Mg Tablet, 500 MG PO DAILY, (Reported)


Rivaroxaban (Xarelto) 2.5 Mg Tablet, 2.5 MG PO BID, (Reported)


Simvastatin (Simvastatin) 20 Mg Tablet, 20 MG PO QHS, (Reported)





Scheduled PRN


Acetaminophen (Tylenol) 325 Mg Tablet, 650 MG PO Q6H PRN for PAIN, (Reported)


Hydrocodone/Acetaminophen (Hydrocodone-Acetamin  mg) 1 Each Tablet, 1 TAB 

PO QID PRN for pain, (Reported)





Allergies


Coded Allergies:  


     Sulfa (Sulfonamide Antibiotics) (Verified  Allergy, Intermediate, rash, )











JEANNETTE GILMORE MD             2021 15:40